# Patient Record
Sex: MALE | Race: WHITE
[De-identification: names, ages, dates, MRNs, and addresses within clinical notes are randomized per-mention and may not be internally consistent; named-entity substitution may affect disease eponyms.]

---
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
## 2019-11-05 NOTE — CT
EXAM: CTA of the chest and abdomen



HISTORY: Cardiac arrest



COMPARISON: None



TECHNIQUE: Multiple contiguous axial images were obtained a CTA of the chest and abdomen with contras
t per aortic dissection protocol. Sagittal and coronal 3-D MIP reformats were performed.



FINDINGS:

HEART: Normal in size without focal cardiac abnormality.

PULMONARY ARTERIES: Normal in caliber without filling defects to suggest pulmonary emboli.

MEDIASTINUM: No hilar or mediastinal lymphadenopathy. There is an endotracheal tube with its tip abov
e the javier.

LUNGS: Bilateral dependent atelectasis versus infiltrates.

PLEURAL SPACE: No pleural effusion or pneumothorax.



CHEST AND ABDOMINAL WALL SOFT TISSUES: A left subclavian central venous catheter seen with its tip in
 the superior vena cava.



LIVER: Unremarkable.

GALLBLADDER: Unremarkable.

KIDNEYS: 4 mm nonobstructing left renal calcification..

SPLEEN: Unremarkable.

PANCREAS: Unremarkable.



BOWEL: Unremarkable. An NG tube is seen in the stomach.

RETROPERITONEUM: No lymphadenopathy

BONES: Unremarkable



ASCENDING THORACIC AORTA:  Normal caliber without evidence of dissection or aneurysmal dilatation.

DESCENDING THORACIC AORTA:  Normal caliber without evidence of dissection or aneurysmal dilatation.

ABDOMINAL AORTA: Normal caliber without evidence of dissection or aneurysmal dilatation.

CELIAC TRUNK: Patent

SMA: Patent

BEN: Patent

RENAL ARTERIES: Bilateral single renal arteries without significant atherosclerotic disease



IMPRESSION:



1. No evidence of thoracic or abdominal aortic aneurysm or dissection

2. Nonobstructing left renal calcification



Reported By: Helio Stephens 

Electronically Signed:  11/5/2019 5:04 PM

## 2019-11-05 NOTE — CT
CT Brain WO Con:



11/5/2019 4:18 PM



CLINICAL HISTORY: History of cardiac arrest; history of fall and collapse at the seen; history of V. 
tach.



IMAGING TECHNIQUE: Multiple CT images were obtained of the brain without IV contrast.



COMPARISON: None.



FINDINGS:

Brain:  No acute infarct or hemorrhage is evident.  No midline shift.

Ventricles: Normal.  No hydrocephalus..

Skull: Intact..

Visualized Paranasal sinuses: There is mild mucosal thickening within the posterior left ethmoid air 
cells..

Mastoid air cells:Clear.

Extracranial soft tissues:Normal.



IMPRESSION:



No acute intracranial abnormality. 



Reported By: Dean Epstein 

Electronically Signed:  11/5/2019 4:57 PM

## 2019-11-05 NOTE — RAD
EXAM: 

CHEST ONE VIEW



HISTORY:

Cardiac arrest.



COMPARISON:

None



FINDINGS:

Endotracheal tube is noted in place with the tip of the endotracheal tube overlying the T1-2 level at
 the level of the thoracic inlet. Nasogastric tube is noted in place which courses into the upper

abdomen. The distal tip is not imaged. Left subclavian central venous catheter is noted in place with
 tip overlying the expected location of the SVC. There is accentuation of the cardiac silhouette

and bronchovascular markings due to a shallow depth of inspiration and the portable technique of the 
study. No consolidation or pleural fluid is seen. Pacing device overlies the right lower lateral

chest. The osseous structures are intact.



IMPRESSION:



1. Lines and tubes in place as described above.

2. Accentuation of the cardiac silhouette and bronchovascular markings due to shallow depth of inspir
ation and portable technique.



Reported By: Jose Cuadra 

Electronically Signed:  11/5/2019 4:32 PM

## 2019-11-05 NOTE — RAD
EXAM: Single view of the chest



HISTORY:   Post intubation



COMPARISON: 11/5/2019



FINDINGS: Single view of the chest shows an enlarged but stable cardiomediastinal silhouette. There i
s an NG tube with its tip in the upper esophagus. The endotracheal tube and central venous catheter

are unchanged in position.  There is no evidence of consolidation, mass, or pleural effusion. The bon
es are unremarkable.



IMPRESSION: Malpositioned NG tube.



Reported By: Helio Stephens 

Electronically Signed:  11/5/2019 6:04 PM

## 2019-11-06 NOTE — RAD
EXAM: Single view of the abdomen



HISTORY: OG tube placement



COMPARISON: None



FINDINGS: Single view of the abdomen shows a nonspecific, nonobstructive bowel gas pattern. An NG tub
e is seen in the stomach. There may be a calcification projecting over the left renal shadow.  The

bones are unremarkable.



IMPRESSION: 

1. OG tube located in the stomach

2. Left nephrolithiasis



Reported By: Helio Stephens 

Electronically Signed:  11/6/2019 7:18 PM
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
## 2019-11-06 NOTE — PDOC.HOSPP
- Subjective


Encounter Date: 11/06/19


Encounter Time: 08:38


Subjective: 





Mr. Abbasi was seen today in follow-up of out of hospital cardiac arrest. He is 

currently intubated. He is moving all extremities. He was reported to have 

squeezed the nurses hand on command.





- Objective


Vital Signs & Weight: 


 Vital Signs (12 hours)











  Pulse Resp BP


 


 11/06/19 08:15  78   104/75


 


 11/06/19 06:00   20 


 


 11/06/19 04:00   20 


 


 11/06/19 02:00   20 


 


 11/06/19 00:00   20 


 


 11/05/19 22:00   20 








 Weight











Weight                         239 lb 3.225 oz











 Most Recent Monitor Data











Heart Rate from ECG            84


 


NIBP                           94/64


 


NIBP BP-Mean                   78


 


Respiration from ECG           20


 


SpO2                           100














I&O: 


 











 11/05/19 11/06/19 11/07/19





 06:59 06:59 06:59


 


Intake Total  1999.0 38.3


 


Output Total  815 125


 


Balance  1184.0 -86.7











Result Diagrams: 


 11/06/19 04:20





 11/06/19 04:20


Additional Labs: 


 Accuchecks











  11/05/19





  16:20


 


POC Glucose  170 H














Hospitalist ROS





- Medication


Medications: 


Active Medications











Generic Name Dose Route Start Last Admin





  Trade Name Freq  PRN Reason Stop Dose Admin


 


Famotidine  20 mg  11/05/19 21:00  11/05/19 22:22





  Pepcid  SLOW IVP   20 mg





  Q12HR PURVI   Administration





     





     





     





     


 


Fentanyl Citrate 2,000 mcg/  100 mls @ 0 mls/hr  11/05/19 16:24  11/06/19 01:13





  Sodium Chloride  IV  12/05/19 16:24  100 mls





  INF PURVI   Administration





     





     





  Protocol   





  Per Protocol   


 


Amiodarone HCl 450 mg/  259 mls @ 0 mls/hr  11/05/19 22:15  11/05/19 22:25





Miscellaneous Medication 1  IVPB   259 mls





each/ Dextrose/Water  INF PURVI   Administration





     





     





  Protocol   





  As Directed   


 


Lorazepam  2 mg  11/05/19 16:45  11/05/19 21:24





  Ativan  SLOW IVP  12/05/19 16:45  2 mg





  Q1H PRN   Administration





  Breakthrough agitation   





     





     





     


 


Sodium Chloride  10 ml  11/05/19 21:00  11/05/19 22:22





  Flush - Normal Saline  IVF   10 ml





  Q12HR PURVI   Administration





     





     





     





     


 


Vecuronium Bromide  10 mg  11/06/19 02:00  11/06/19 05:23





  Norcuron  IV   10 mg





  Q30MIN PRN   Administration





  SHIVERING   





     





     





     














- Exam


Eye: PERRL


Heart: RRR, no murmur, no gallops, no rubs, normal peripheral pulses


Respiratory: CTAB (with some rhonchi bilaterally), no rales


Gastrointestinal: soft, non-distended, normal bowel sounds


Extremities: no cyanosis, no clubbing, no edema





Hosp A/P


(1) Cardiac arrest


Code(s): I46.9 - CARDIAC ARREST, CAUSE UNSPECIFIED   Status: Acute   





(2) Cardiomyopathy


Code(s): I42.9 - CARDIOMYOPATHY, UNSPECIFIED   Status: Acute   





(3) Acute respiratory failure with hypoxia


Code(s): J96.01 - ACUTE RESPIRATORY FAILURE WITH HYPOXIA   Status: Acute   





- Plan





* Patient is s/p out of hospital cardiac arrest. He had ROSC after 2 episodes 

of CPR. He has been found to have a Cardiomyopathy with an EF of 20-25%


* Discussed with Dr. Azul


* Will begin to re-warm him


* Plan is for cardiac cath today


* Ventilator management as per Pulmonology

## 2019-11-06 NOTE — RAD
XR Chest 1 View Portable



History: Ventilated patient



Comparison: Radiograph prior day



Findings: Endotracheal tube tip just below the level of the clavicles. No enteric tube is appreciated
. Central venous catheter tip mid SVC.



Small effusions. Scattered atelectasis. No pneumothorax.



Impression: Interval removal of the enteric tube otherwise similar exam.



Reported By: Pancho Glynn 

Electronically Signed:  11/6/2019 9:46 AM

## 2019-11-06 NOTE — CON
DATE OF CONSULTATION:  



HISTORY OF PRESENT ILLNESS:  Pramod Abbasi is a 39-year-old gentleman, who was

brought into the ER last night after he had apparently a cardiac arrest. 



He owns a construction company.  Apparently, he collapsed at work.  He was found to

be in VFib, ventricular tachycardia, pulseless electrical activity.  As per the EMS,

CPR was initiated.  Given 3 amps of epi, 300 mg amiodarone, intubated, LMA placed

and transferred to [facility] where seen by the ER physician and ongoing

additional prolonged CPR, shock off, he was revived after 20 minutes in the ER. 



These findings were discussed with the hospitalist on call last night.  He was

placed apparently on a hypothermia protocol as per my suggestion.  Unfortunately, he

was shivering throughout the night and received maximum fentanyl and propofol.  He

was given paralytics at 0200 hours in the morning.  Apparently, this morning became

more responsive, more appropriate.  His hypothermia protocol was discontinued. 



His fiancee is at the bedside who states that he had a physical a year ago.  He

takes no medication.  He has never been to the hospital. 



PAST MEDICAL HISTORY:  Apparently unremarkable.



PAST SURGICAL HISTORY:  None.



CHRONIC MEDICATIONS:  None.



ALLERGIES:  NONE.



SOCIAL HISTORY:  He owns a construction company.



PHYSICAL EXAMINATION:

VITAL SIGNS:  He is still on fentanyl and Diprivan.  His pulse is 90, blood pressure

90/80, respirations are 20. 

HEENT:  Pupils are equal. 

CHEST:  Decreased breath sounds.  No wheezing. 

CARDIAC:  Normal S1, S2.  No gallops. 

ABDOMEN:  Soft.  No masses.



LABORATORY DATA:  White count 8000, H and H 13 and 39, platelet count 167.  Lytes

are normal. 



CK-MB is abnormal.  Urine is normal.  He had benzos in his urine.  Chest x-ray was

normal on admission, slight cardiomegaly.  CT brain showed no acute abnormality.  CT

chest dissection protocol, no aneurysm or dissection was seen.  There is a

nonobstructive left renal calculi. 



IMPRESSION:  Status post cardiopulmonary arrest, ejection fraction 20%.



PLAN:  Await input from Cardiology.  Continue vent support.  Discontinue paralytics.

 Start nutrition 24-48 hours. 



PT subsequently. 



45 minutes of critical time.







Job ID:  [id number]

## 2019-11-07 NOTE — PDOC.CPN
- Subjective


Date: 11/07/19


Time: 08:53


Interval history: 





The pt seen and examined.  No overnight events.  He is on Vent with sedation.  

He opened his eyes with verbal stimulation.  Per family, he respond well 

yesterday. 





- Objective


Allergies/Adverse Reactions: 


 Allergies











Allergy/AdvReac Type Severity Reaction Status Date / Time


 


No Allergy Information Allergy   Verified 11/06/19 07:55





Available     











Visit Medications: 


 Current Medications





Acetaminophen (Tylenol Elixir)  650 mg PO Q4H PRN


   PRN Reason: Headache/Fever/Mild Pain (1-3)


   Last Admin: 11/07/19 07:42 Dose:  650 mg


Acetaminophen/Codeine Phosphate (Tylenol #3)  1 tab PO Q4H PRN


   PRN Reason: Mild Pain (1-3)


Acetaminophen/Codeine Phosphate (Tylenol #3)  2 tab PO Q4H PRN


   PRN Reason: Moderate Pain (4-6)


Enoxaparin Sodium (Lovenox)  60 mg SC DAILY [facility]


Famotidine (Pepcid)  20 mg SLOW IVP Q12HR [facility]


   Last Admin: 11/06/19 20:46 Dose:  20 mg


Fentanyl Citrate 2,000 mcg/ (Sodium Chloride)  100 mls @ 0 mls/hr IV INF [facility]; 

Protocol


   Stop: 12/05/19 16:24


   Last Admin: 11/06/19 17:56 Dose:  100 mls


Levetiracetam 500 mg/ Device  100 mls @ 200 mls/hr IVPB BID [facility]


   Last Admin: 11/06/19 20:46 Dose:  100 mls


Amiodarone HCl 450 mg/Miscellaneous Medication 1 each/ Dextrose/Water  259 mls 

@ 0 mls/hr IVPB INF [facility]; Protocol


   Last Admin: 11/07/19 04:02 Dose:  259 mls


Norepinephrine Bitartrate (Levophed)  250 mls @ 0 mls/hr IVPB INF [facility]; Protocol


Sodium Chloride (Normal Saline 0.9%)  200 mls @ 0 mls/hr IV ONE PRN


   PRN Reason: SBP < 90


   Stop: 11/07/19 17:58


Ceftriaxone Sodium 1 gm/ (Sodium Chloride)  100 mls @ 200 mls/hr IVPB Q24HR [facility]


Lorazepam (Ativan)  2 mg SLOW IVP Q1H PRN


   PRN Reason: Breakthrough agitation


   Stop: 12/05/19 16:45


   Last Admin: 11/05/19 21:24 Dose:  2 mg


Miscellaneous Information (Communication Order-Pharmacy)  1 each FS 

.COMMUNICATION [facility]


Miscellaneous Medication (Dc Sedation Protocol)  1 each FS ONE ONE


   Stop: 11/07/19 08:41


Morphine Sulfate (Morphine)  2 mg SLOW IVP Q1H PRN


   PRN Reason: BREAKTHROUGH PAIN/Agitation


   Stop: 12/05/19 16:45


Nitroglycerin (Nitrostat)  0.4 mg SL Q5MIN PRN


   PRN Reason: Chest Pain


Discontinue Previous Narcotic Pain Medications And Benzodiazepines  1 each FS 

.ONE [facility]


   Stop: 12/05/19 16:45


Propofol (Diprivan)  1,000 mg IV INF PRN; Protocol


   PRN Reason: TO ACHIEVE GOAL RASS


   Stop: 12/05/19 16:45


   Last Admin: 11/07/19 05:33 Dose:  1,000 mg


Propofol (Diprivan Bolus)  20 mg IV Q5MIN PRN


   PRN Reason: BREAKTHROUGH AGITATION


   Stop: 12/05/19 16:45


Sodium Chloride (Flush - Normal Saline)  10 ml IVF Q12HR [facility]


   Last Admin: 11/06/19 20:47 Dose:  10 ml


Sodium Chloride (Flush - Normal Saline)  10 ml IVF PRN PRN


   PRN Reason: Saline Flush








Vital Signs & Weight: 


 Vital Signs











  Pulse Resp


 


 11/07/19 08:00   22 H


 


 11/07/19 07:30  91 


 


 11/07/19 06:00   20


 


 11/07/19 04:00   20


 


 11/07/19 02:26  81 


 


 11/07/19 02:00   20


 


 11/07/19 00:00   20


 


 11/06/19 22:11  79 


 


 11/06/19 22:00   20








 











Admit Weight                   239 lb 3.225 oz


 


Weight                         239 lb 3.225 oz

















- Physical Exam


Cardiac: regular rate and rhythm, S1/S2


Lungs: decreased breath sounds


Skin: clear





- Labs


Result Diagrams: 


 11/07/19 12:57





 11/07/19 12:57


 Troponin/CKMB











CK-MB (CK-2)  21.4 ng/mL (0-6.6)  H*  11/06/19  17:00    


 


Troponin I  0.189 ng/mL (< 0.028)  H  11/06/19  17:00    














- Telemetry


Sinus rhythms and dysrhythmias: sinus rhythm





- Assessment/Plan


Assessment/Plan: 





1. s/p Cardiac arrest - On Amiodarone drip


2. Familial CMY - s/p LHC on 1/06/2019 wtih normal coronary arteries.


3. Acute on Chronic systolic HF


MAR reviewed





* Echo on 11/06/2019 with EF 20-25%, global hypokinesis, mild MR and TR





Pt. seen and eval. by me. He is extubated. RRR. Neurologically seems to be 

recovering. No arrhythmias. Tender chest wall s/p CPR. Movement of the ant. 

chest ribs with palpation. Likely fractures s/p CPR. Plan for AICD tomorrow.


Start Coreg and lisinopril when stable or start cozaar with the plan to switch 

to Entresto or possibly just start Entresto.

## 2019-11-07 NOTE — PDOC.HOSPP
- Subjective


Encounter Date: 11/07/19


Encounter Time: 09:18


Subjective: 





Mr. Boyd was seen today in follow-up of cardiomyopathy with sudden death. He 

is now extubated. He is awake and alert, but confused about what has happened 

over the past 2 days. He notes soreness in his chest.





- Objective


Vital Signs & Weight: 


 Vital Signs (12 hours)











  Pulse Resp


 


 11/07/19 08:00   22 H


 


 11/07/19 07:30  91 


 


 11/07/19 06:00   20


 


 11/07/19 04:00   20


 


 11/07/19 02:26  81 


 


 11/07/19 02:00   20


 


 11/07/19 00:00   20


 


 11/06/19 22:11  79 


 


 11/06/19 22:00   20








 Weight











Admit Weight                   239 lb 3.225 oz


 


Weight                         239 lb 3.225 oz











 Most Recent Monitor Data











Heart Rate from ECG            92


 


NIBP                           105/65


 


NIBP BP-Mean                   77


 


Respiration from ECG           19


 


SpO2                           97














I&O: 


 











 11/06/19 11/07/19 11/08/19





 06:59 06:59 06:59


 


Intake Total 1999.0 1508.1 


 


Output Total 815 947 5


 


Balance 1184.0 561.1 -5











Result Diagrams: 


 11/07/19 05:30





 11/07/19 05:30





Hospitalist ROS





- Medication


Medications: 


Active Medications











Generic Name Dose Route Start Last Admin





  Trade Name Freq  PRN Reason Stop Dose Admin


 


Acetaminophen  650 mg  11/07/19 07:22  11/07/19 07:42





  Tylenol Elixir  PO   650 mg





  Q4H PRN   Administration





  Headache/Fever/Mild Pain (1-3)   





     





     





     


 


Famotidine  20 mg  11/05/19 21:00  11/06/19 20:46





  Pepcid  SLOW IVP   20 mg





  Q12HR [facility]   Administration





     





     





     





     


 


Levetiracetam 500 mg/ Device  100 mls @ 200 mls/hr  11/06/19 09:00  11/06/19 20:

46





  IVPB   100 mls





  BID [facility]   Administration





     





     





     





     


 


Amiodarone HCl 450 mg/  259 mls @ 0 mls/hr  11/05/19 22:15  11/07/19 04:02





Miscellaneous Medication 1  IVPB   259 mls





each/ Dextrose/Water  INF [facility]   Administration





     





     





  Protocol   





  As Directed   


 


Sodium Chloride  10 ml  11/05/19 21:00  11/06/19 20:47





  Flush - Normal Saline  IVF   10 ml





  Q12HR [facility]   Administration





     





     





     





     














- Exam


Eye: PERRL


Heart: RRR, no murmur, normal peripheral pulses


Heart - other findings: + S3


Respiratory: CTAB (except occasional rhonchi)


Gastrointestinal: soft, non-tender, non-distended, normal bowel sounds, no 

palpable masses, no hepatomegaly


Extremities: no cyanosis, no clubbing, no edema


Psychiatric: normal affect, A&O x 3





Hosp A/P


(1) Cardiac arrest


Code(s): I46.9 - CARDIAC ARREST, CAUSE UNSPECIFIED   Status: Acute   





(2) Cardiomyopathy


Code(s): I42.9 - CARDIOMYOPATHY, UNSPECIFIED   Status: Acute   





(3) Acute respiratory failure with hypoxia


Code(s): J96.01 - ACUTE RESPIRATORY FAILURE WITH HYPOXIA   Status: Acute   





- Plan





* Cardiomyopathy with sudden death-Discussed with Dr. Azul. He has normal 

coronary arteries.


* He has been evaluated by EP, and the plan is the placement of a defibrillator


* Respiratory failure- improved- he has been extubated. He will need pain 

control, and aggressive pulmonary toilet

## 2019-11-07 NOTE — PDOC.CPN
- Subjective


Date: 11/07/19


Time: 08:00


Interval history: 





EP PROGRESS NOTE: 11/7/19





Follow up after VF arrest. Patient just extubated and is somewhat disoriented 

from his recent sedation. 





- Review of Systems


ROS unobtainable: due to mental status





- Objective


Allergies/Adverse Reactions: 


 Allergies











Allergy/AdvReac Type Severity Reaction Status Date / Time


 


No Allergy Information Allergy   Verified 11/06/19 07:55





Available     











Visit Medications: 


 Current Medications





Acetaminophen (Tylenol Elixir)  650 mg PO Q4H PRN


   PRN Reason: Headache/Fever/Mild Pain (1-3)


   Last Admin: 11/07/19 07:42 Dose:  650 mg


Acetaminophen/Codeine Phosphate (Tylenol #3)  1 tab PO Q4H PRN


   PRN Reason: Mild Pain (1-3)


Acetaminophen/Codeine Phosphate (Tylenol #3)  2 tab PO Q4H PRN


   PRN Reason: Moderate Pain (4-6)


   Last Admin: 11/07/19 15:09 Dose:  2 tab


Amiodarone HCl (Cordarone)  200 mg PO TID [facility]


   Last Admin: 11/07/19 15:09 Dose:  200 mg


Carvedilol (Coreg)  3.125 mg PO BID-[facility]


Enoxaparin Sodium (Lovenox)  60 mg SC DAILY [facility]


   Last Admin: 11/07/19 10:38 Dose:  60 mg


Famotidine (Pepcid)  20 mg SLOW IVP Q12HR [facility]


   Last Admin: 11/07/19 10:38 Dose:  20 mg


Levetiracetam 500 mg/ Device  100 mls @ 200 mls/hr IVPB BID [facility]


   Last Admin: 11/07/19 11:17 Dose:  100 mls


Norepinephrine Bitartrate (Levophed)  250 mls @ 0 mls/hr IVPB INF [facility]; Protocol


Sodium Chloride (Normal Saline 0.9%)  200 mls @ 0 mls/hr IV ONE PRN


   PRN Reason: SBP < 90


   Stop: 11/07/19 17:58


Ceftriaxone Sodium 1 gm/ (Sodium Chloride)  100 mls @ 200 mls/hr IVPB Q24HR [facility]


   Last Admin: 11/07/19 11:32 Dose:  100 mls


Ipratropium Bromide (Atrovent)  2.5 ml NEB [id number] PRN


   PRN Reason: SOB &/or Wheezing


Miscellaneous Information (Communication Order-Pharmacy)  1 each FS 

.COMMUNICATION [facility]


Nitroglycerin (Nitrostat)  0.4 mg SL Q5MIN PRN


   PRN Reason: Chest Pain


Sodium Chloride (Flush - Normal Saline)  10 ml IVF Q12HR [facility]


   Last Admin: 11/07/19 10:38 Dose:  10 ml


Sodium Chloride (Flush - Normal Saline)  10 ml IVF PRN PRN


   PRN Reason: Saline Flush








Vital Signs & Weight: 


 Vital Signs











  Temp Pulse Resp


 


 11/07/19 12:00  99 F  


 


 11/07/19 08:00  101.5 F H   22 H


 


 11/07/19 07:30   91 


 


 11/07/19 06:00    20


 


 11/07/19 04:00    20








 











Admit Weight                   239 lb 3.225 oz


 


Weight                         239 lb 3.225 oz

















- Physical Exam


General: appears well, no apparent distress


HEENT: mucus membranes moist, normocephaly


Neck: supple neck, midline trachea, no JVD/HJR, no masses, no bruit, no 

lymphadenopathy, no thromegaly


Cardiac: regular rate and rhythm, no murmur, regular rate, regular rhythm


Lungs: clear to auscultation, normal breath sounds, normal exam, no wheeze, 

rales, rhonchi


Neuro: cranial nerve 2-12 intact, grossly intact, coordination normal, no 

lateralizing findings


Abdomen: unremarkable, active bowel sounds, soft


Extremities: no cyanosis, no clubbing, no edema





- Labs


Result Diagrams: 


 11/07/19 12:57





 11/07/19 12:57


 Troponin/CKMB











CK-MB (CK-2)  21.4 ng/mL (0-6.6)  H*  11/06/19  17:00    


 


Troponin I  0.189 ng/mL (< 0.028)  H  11/06/19  17:00    














- Telemetry


Sinus rhythms and dysrhythmias: sinus rhythm





- Assessment/Plan


Assessment/Plan: 





1. Ventricular fibrillation arrest with successful resuscitation and 

defibrillation.


2. Newly found cardiomyopathy, likely nonischemic with LVEF of 20% to 25%.








Continue amiodarone. Will transition to PO taper possibly tomorrow if taking 

eating/drinking. Clearly meets criteria for ICD implant for secondary 

prevention of sudden cardiac death with cardiomyopathy.   Discussed R/B/A with 

family and patient who wish to proceed. Plan for implant tomorrow. Plan for 

single chamber ICD unless new arrhythmia issues are seen before implant.
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
## 2019-11-07 NOTE — RAD
EXAM: 

CHEST ONE VIEW



HISTORY:

On ventilator. Follow-up evaluation.



COMPARISON:

11/6/2019.



FINDINGS:

Endotracheal tube remains in place with tip overlying the region of the T2 vertebral body. Nasogastri
c tube has been placed in the interim which courses into the upper abdomen. The tip is not imaged.

Cardiac monitor leads again overlie the chest. Left subclavian central venous catheter is stable in p
osition. This examination is obtained in a shallow depth of inspiration which accentuates the

cardiac silhouette and bronchovascular markings. Subsegmental atelectasis at the right lung base is a
gain seen. There is increased density at the left lung base which may be related to atelectasis,

left pleural effusion or possibly superimposed infiltrate/pneumonia. There has been no significant in
terval change from prior study.



IMPRESSION:

Interval placement of a nasogastric tube, but the chest is otherwise stable.



Reported By: Jose Cuadra 

Electronically Signed:  11/7/2019 8:05 AM

## 2019-11-07 NOTE — PRG
DATE OF SERVICE:  11/07/2019



SUBJECTIVE:  This morning, he is awake, and responsive.  X-ray still shows

cardiomegaly. 



OBJECTIVE:  VITAL SIGNS:  Pulse 90, respiratory rate 22, blood pressure __________. 

CHEST:  Decreased breath sounds.  No wheezing.  Bilateral crackles and rhonchi

anteriorly. 

CARDIAC:  Normal S1 and S2.  No gallops. 

ABDOMEN:  No masses.



LABORATORY DATA:  Lytes are normal.  Troponin is elevated.  White count 9000, H and

H are 11 and 36.  X-ray shows cardiomegaly, bibasilar atelectatic changes. 



IMPRESSION:  

1. Respiratory failure.

2. Cardiomyopathy.

3. Status post ventricular tachycardia.



PLAN:  He went to the cardiac cath last night.  Apparently, cords were normal. 



Wean and extubate.  Empiric antibiotics.  Supportive care. 



One-half hour of critical time.







Job ID:  [id number]

## 2019-11-08 NOTE — PRG
DATE OF SERVICE:  11/08/2019



SUBJECTIVE:  Pramod Abbasi, this morning, is awake, alert, responsive.  He is no

longer confused. 



OBJECTIVE:  VITAL SIGNS:  Pulse 84, blood pressure 127/80, saturations 96%, and

respirations 25. 

CHEST:  Decreased breath sounds.  No wheezing. 

CARDIAC:  Normal S1 and S2.  No gallops. 

ABDOMEN:  No masses.



LABORATORY DATA:  Lytes are normal.  White count is normal.



IMAGING DATA:  X-ray shows cardiomegaly.



IMPRESSION:  Status post ventricular tachycardia, ventricular fibrillation,

cardiopulmonary arrest, prolonged CPR, cardiomyopathy, respiratory failure. 



PLAN:  Continue supportive care.  He is scheduled to have an AICD placed in today. 



We will follow.  Switch over to oral antibiotics tomorrow.







Job ID:  [id number]

## 2019-11-08 NOTE — PDOC.HOSPP
- Subjective


Encounter Date: 11/08/19


Encounter Time: 10:05


Subjective: 





Mr. Abbasi was seen today in follow-up of non-ischemic cardiomyopathy, and post 

cardiac arrest due to sudden death. He does not have any complaints. He 

continues to have some amnesia of the previous few days. The soreness in his 

chest has improved.





- Objective


Vital Signs & Weight: 


 Vital Signs (12 hours)











  Temp Pulse Ox


 


 11/08/19 04:00  98.7 F 


 


 11/08/19 00:15   96


 


 11/08/19 00:00  98.3 F 








 Weight











Admit Weight                   239 lb 3.225 oz


 


Weight                         246 lb 4.101 oz











 Most Recent Monitor Data











Heart Rate from ECG            82


 


NIBP                           123/79


 


NIBP BP-Mean                   84


 


Respiration from ECG           23


 


SpO2                           97














I&O: 


 











 11/07/19 11/08/19 11/09/19





 06:59 06:59 06:59


 


Intake Total 1508.1 2143 


 


Output Total 947 1965 


 


Balance 561.1 178 











Result Diagrams: 


 11/08/19 03:15





 11/08/19 03:15





Hospitalist ROS





- Medication


Medications: 


Active Medications











Generic Name Dose Route Start Last Admin





  Trade Name Freq  PRN Reason Stop Dose Admin


 


Acetaminophen  650 mg  11/07/19 07:22  11/07/19 07:42





  Tylenol Elixir  PO   650 mg





  Q4H PRN   Administration





  Headache/Fever/Mild Pain (1-3)   





     





     





     


 


Acetaminophen/Codeine Phosphate  2 tab  11/06/19 17:57  11/07/19 15:09





  Tylenol #3  PO   2 tab





  Q4H PRN   Administration





  Moderate Pain (4-6)   





     





     





     


 


Hydrocodone Bitart/Acetaminophen  1 tab  11/07/19 18:25  11/08/19 06:36





  [place] 5/325  PO   1 tab





  Q4H PRN   Administration





  Severe Pain (7-10)   





     





     





     


 


Amiodarone HCl  200 mg  11/07/19 15:00  11/08/19 08:46





  Cordarone  PO   200 mg





  TID [facility]   Administration





     





     





     





     


 


Carvedilol  3.125 mg  11/07/19 17:00  11/08/19 08:46





  Coreg  PO   3.125 mg





  BID-WM [facility]   Administration





     





     





     





     


 


Enoxaparin Sodium  60 mg  11/07/19 09:00  11/08/19 07:31





  Lovenox  SC   Not Given





  DAILY [facility]   





     





     





     





     


 


Famotidine  20 mg  11/05/19 21:00  11/08/19 08:46





  Pepcid  SLOW IVP   20 mg





  Q12HR [facility]   Administration





     





     





     





     


 


Ceftriaxone Sodium 1 gm/  100 mls @ 200 mls/hr  11/07/19 09:00  11/08/19 08:45





  Sodium Chloride  IVPB   100 mls





  Q24HR [facility]   Administration





     





     





     





     


 


Sodium Chloride  10 ml  11/05/19 21:00  11/08/19 09:07





  Flush - Normal Saline  IVF   10 ml





  Q12HR [facility]   Administration





     





     





     





     


 


Tramadol HCl  50 mg  11/07/19 15:54  11/08/19 04:11





  Ultram  PO   50 mg





  Q6H PRN   Administration





  Breakthrough Pain   





     





     





     














- Exam


Eye: PERRL


Heart: RRR, no murmur, no gallops, no rubs, normal peripheral pulses


Respiratory: CTAB, no wheezes, no rales, no ronchi, normal chest expansion


Gastrointestinal: soft, non-tender, non-distended, normal bowel sounds, no 

palpable masses


Extremities: no cyanosis, no clubbing, no edema





Hosp A/P


(1) Cardiac arrest


Code(s): I46.9 - CARDIAC ARREST, CAUSE UNSPECIFIED   Status: Acute   





(2) Cardiomyopathy


Code(s): I42.9 - CARDIOMYOPATHY, UNSPECIFIED   Status: Acute   





(3) Acute respiratory failure with hypoxia


Code(s): J96.01 - ACUTE RESPIRATORY FAILURE WITH HYPOXIA   Status: Acute   





- Plan





*  Non-Ischemic Cardiomyopathy with sudden death-plan is for single chamber 

defibrillator today


* Respiratory failure- improved- He will need pain control, and aggressive 

pulmonary toilet

## 2019-11-08 NOTE — PDOC.CPN
- Subjective


Date: 11/08/19


Time: 11:00





- Review of Systems


Respiratory: denies: shortness of breath


Cardiovascular: reports: chest pain, palpitation (tender chest wall after CPR.)

.  denies: edema


Gastrointestinal: denies: nausea, vomiting


Musculoskeletal: reports: pain.  denies: stiffness, swelling


Neurological: denies: numbness, seizure





- Objective


Allergies/Adverse Reactions: 


 Allergies











Allergy/AdvReac Type Severity Reaction Status Date / Time


 


No Known Drug Allergies Allergy   Verified 11/08/19 13:44











Visit Medications: 


 Current Medications





Acetaminophen (Tylenol Elixir)  650 mg PO Q4H PRN


   PRN Reason: Headache/Fever/Mild Pain (1-3)


   Last Admin: 11/07/19 07:42 Dose:  650 mg


Acetaminophen/Codeine Phosphate (Tylenol #3)  1 tab PO Q4H PRN


   PRN Reason: Mild Pain (1-3)


Acetaminophen/Codeine Phosphate (Tylenol #3)  2 tab PO Q4H PRN


   PRN Reason: Moderate Pain (4-6)


   Last Admin: 11/08/19 12:46 Dose:  2 tab


Hydrocodone Bitart/Acetaminophen (Norco 5/325)  1 tab PO Q4H PRN


   PRN Reason: Severe Pain (7-10)


   Last Admin: 11/08/19 06:36 Dose:  1 tab


Amiodarone HCl (Cordarone)  200 mg PO TID [facility]


   Last Admin: 11/08/19 14:58 Dose:  200 mg


Carvedilol (Coreg)  3.125 mg PO BID-[facility]


   Last Admin: 11/08/19 08:46 Dose:  3.125 mg


Enoxaparin Sodium (Lovenox)  60 mg SC DAILY [facility]


   Last Admin: 11/08/19 07:31 Dose:  Not Given


Famotidine (Pepcid)  20 mg SLOW IVP Q12HR [facility]


   Last Admin: 11/08/19 08:46 Dose:  20 mg


Norepinephrine Bitartrate (Levophed)  250 mls @ 0 mls/hr IVPB INF [facility]; Protocol


Ceftriaxone Sodium 1 gm/ (Sodium Chloride)  100 mls @ 200 mls/hr IVPB Q24HR [facility]


   Last Admin: 11/08/19 08:45 Dose:  100 mls


Ipratropium Bromide (Atrovent)  2.5 ml NEB [id number] PRN


   PRN Reason: SOB &/or Wheezing


   Last Admin: 11/08/19 13:30 Dose:  2.5 ml


Miscellaneous Information (Communication Order-Pharmacy)  1 each FS 

.COMMUNICATION [facility]


Nitroglycerin (Nitrostat)  0.4 mg SL Q5MIN PRN


   PRN Reason: Chest Pain


Sodium Chloride (Flush - Normal Saline)  10 ml IVF Q12HR [facility]


   Last Admin: 11/08/19 09:07 Dose:  10 ml


Sodium Chloride (Flush - Normal Saline)  10 ml IVF PRN PRN


   PRN Reason: Saline Flush


Tramadol HCl (Ultram)  50 mg PO Q6H PRN


   PRN Reason: Breakthrough Pain


   Last Admin: 11/08/19 11:10 Dose:  50 mg








Vital Signs & Weight: 


 Vital Signs











  Temp Pulse Pulse Pulse Resp BP BP


 


 11/08/19 13:30   88    20  


 


 11/08/19 12:00  98.8 F      


 


 11/08/19 09:40    79  100   125/92 H  132/91 H


 


 11/08/19 08:00       


 


 11/08/19 07:00  98.9 F      














  Pulse Ox


 


 11/08/19 13:30  94 L


 


 11/08/19 12:00 


 


 11/08/19 09:40 


 


 11/08/19 08:00  93 L


 


 11/08/19 07:00 








 











Admit Weight                   239 lb 3.225 oz


 


Weight                         246 lb 4.101 oz

















- Quality Measures


CV meds: Beta Blocker: Yes, ACE/ARB: No (will start after BP stable.), Statin: 

No, ASA: Yes





- Physical Exam


HEENT: normocephaly


Neck: supple neck, no JVD/HJR, no lymphadenopathy


Cardiac: regular rate and rhythm, no murmur


Lungs: clear to auscultation, no wheezes, no rhonchi


Neuro: grossly intact, other (still some memory issues and mild confusion.)


Abdomen: unremarkable


Extremities: no cyanosis, no clubbing, no edema


Musculoskeletal: normal range of motion (pain in chest to palpation.)





- Labs


Result Diagrams: 


 11/08/19 03:15





 11/08/19 03:15


 Troponin/CKMB











CK-MB (CK-2)  21.4 ng/mL (0-6.6)  H*  11/06/19  17:00    


 


Troponin I  0.189 ng/mL (< 0.028)  H  11/06/19  17:00    














- Telemetry


Supraventricular conduction: other (short run 5-6 beats of NSVTACH this AM 

while he started to walk in CCU.)





- Assessment/Plan


Assessment/Plan: 








1. s/p Cardiac arrest - On Amiodarone , po. %-^ beat run of NSVTACH this AM. 

For AICD today.


2. Familial CMY - s/p LHC on 1/06/2019 wtih normal coronary arteries. Genetic 

testing of the father Variant in LMNA ([id number]). this has been identified as 

being associated with cardiomyopathy. I suspect he may have the same genetic 

variant.


3. Acute on Chronic systolic HF. Continue Coreg, increase as tolerated. Add 

cozaar or Entresto.


MAR reviewed





* Echo on 11/06/2019 with EF 20-25%, global hypokinesis, mild MR and TR

## 2019-11-08 NOTE — RAD
PORTABLE CHEST:

 

HISTORY: 

Respiratory distress.

 

COMPARISON: 

Prior day's exam.

 

FINDINGS: 

NG tube has been removed.  The left subclavian line is unchanged in position.  Heart size is enlarged
 with mild vascular engorgement.

 

IMPRESSION: 

Interval removal of endotracheal and NG Tubes; otherwise, stable exam.

 

POS: OFF

## 2019-11-09 NOTE — PDOC.HOSPP
- Subjective


Encounter Date: 11/09/19


Encounter Time: 11:00


Subjective: 





Queta is s/p cardiac arrest. States he had some warning symptoms on Monday 

where his face turned white and sweaty, but EMS told him EKG was fine. [place] 

[place] he had cardiac arrest.  He was found to be in Vtach, s/p AICD 11/8. 





 Patient states he is not starting to remember things for the first time.   

Patient is doing well, feels great. He ambulated around the hallway without 

problem.  He reports some rib pain when he coughs, he believes this is from 

CPR. He is not bringing up phlegm. Denies shortness of breath. 





Patient wants to be discharged and go home. However oxygen saturation on finger 

on room air was 87%. 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 








Per wife she feels patient is having some short term memory  loss and is asking 

same questions repeatedly but not as bad as yesterday. 








- Objective


Vital Signs & Weight: 


 Vital Signs (12 hours)











  Temp Pulse Pulse BP BP Pulse Ox Pulse Ox


 


 11/09/19 09:06   91  86  151/98 H  119/85   94 L


 


 11/09/19 07:22       96 


 


 11/09/19 07:00  98.2 F      


 


 11/09/19 04:00  98.6 F      


 


 11/09/19 00:00  98.3 F      














  Pulse Ox


 


 11/09/19 09:06  96


 


 11/09/19 07:22 


 


 11/09/19 07:00 


 


 11/09/19 04:00 


 


 11/09/19 00:00 








 Weight











Admit Weight                   239 lb 3.225 oz


 


Weight                         246 lb 4.101 oz











 Most Recent Monitor Data











Heart Rate from ECG            84


 


NIBP                           137/97


 


NIBP BP-Mean                   108


 


Respiration from ECG           18


 


SpO2                           91














I&O: 


 











 11/08/19 11/09/19 11/10/19





 06:59 06:59 06:59


 


Intake Total 2143 1142 360


 


Output Total 1965 1400 580


 


Balance 178 -258 -220











Result Diagrams: 


 11/09/19 05:10





 11/09/19 05:10





Hospitalist ROS





- Review of Systems


Constitutional: denies: fever, chills





- Medication


Medications: 


Active Medications











Generic Name Dose Route Start Last Admin





  Trade Name Freq  PRN Reason Stop Dose Admin


 


Acetaminophen  650 mg  11/07/19 07:22  11/07/19 07:42





  Tylenol Elixir  PO   650 mg





  Q4H PRN   Administration





  Headache/Fever/Mild Pain (1-3)   





     





     





     


 


Acetaminophen/Codeine Phosphate  2 tab  11/06/19 17:57  11/08/19 12:46





  Tylenol #3  PO   2 tab





  Q4H PRN   Administration





  Moderate Pain (4-6)   





     





     





     


 


Amiodarone HCl  200 mg  11/07/19 15:00  11/09/19 08:56





  Cordarone  PO   200 mg





  TID [facility]   Administration





     





     





     





     


 


Carvedilol  3.125 mg  11/07/19 17:00  11/09/19 08:56





  Coreg  PO   3.125 mg





  BID-WM [facility]   Administration





     





     





     





     


 


Enoxaparin Sodium  60 mg  11/07/19 09:00  11/09/19 08:56





  Lovenox  SC   60 mg





  DAILY [facility]   Administration





     





     





     





     


 


Ibuprofen  400 mg  11/08/19 19:48  11/08/19 20:12





  Motrin  PO   400 mg





  Q4H PRN   Administration





  Fever/Mild Pain   





     





     





     


 


Ipratropium Bromide  2.5 ml  11/07/19 09:28  11/08/19 13:30





  Atrovent  NEB   2.5 ml





  [id number] PRN   Administration





  SOB &/or Wheezing   





     





     





     


 


Sacubitril/Valsartan  1 tab  11/08/19 21:00  11/09/19 09:55





  Entresto 24 Mg-26 Mg Tablet  PO   1 tab





  BID [facility]   Administration





     





     





     





     


 


Sodium Chloride  10 ml  11/05/19 21:00  11/09/19 08:57





  Flush - Normal Saline  IVF   10 ml





  Q12HR [facility]   Administration





     





     





     





     


 


Tramadol HCl  50 mg  11/07/19 15:54  11/09/19 04:42





  Ultram  PO   50 mg





  Q6H PRN   Administration





  Breakthrough Pain   





     





     





     














- Exam


General Appearance: NAD, awake alert


Eye: PERRL, anicteric sclera


ENT: normocephalic atraumatic, no oropharyngeal lesions


Neck: supple, symmetric, no thyromegaly


Heart: RRR, no murmur, no gallops, no rubs


Respiratory: CTAB, no wheezes, no rales, no ronchi


Gastrointestinal: soft, non-tender, non-distended


Extremities: no cyanosis, no clubbing, no edema


Skin: normal turgor, no lesions, no rashes


Skin - other findings: Pacemaker in place on left side of chest, no signs of 

infection 


Neurological: cranial nerve grossly intact, normal sensation to touch, no focal 

deficits, no new deficit





Hosp A/P





- Plan


ECHO: mild MR, mild TR, global hypokinesis with EF 20%


Cardiac cath : normal 





This is 39 year old male who presented with out of hospital cardiac arrest, s/p 

AICD placement 11/8








Acute hypoxic respiratory failure s/p cardiac arrest s/p AICD placement


- ECHO showed EF 20%, cardiac cath was normal. Last troponin 0.189, patient 

denies chest pain


- continue amiodarone, coreg, entresto 


- on 2L oxygen saturating 95%, room air 87%. Will give one dose of 40 mg IV 

lasix per cardiology. Chest X ray today normal 


- resume diet 


- transfer to floor with telemetry


- s/p 3 days IV ceftriaxone, discontinue since no evidence of pneumonia 








Anemia


- Hb 11.8, stable, continue to monitor








Short term memory impairment


- likely ICU delirium


- check B12 level











Code status: full code

## 2019-11-09 NOTE — PRG
DATE OF SERVICE:  2019



SERVICE:  Pulmonary medicine.



INTERVAL HISTORY:  The patient is doing outstanding from respiratory 
standpoint.  He

still requiring a little bit of oxygen.  Denies any chest discomfort, nausea, or

vomiting.  He has been up walking the hallways without difficulty.  He got a

pacemaker placed and this was without incident.  Otherwise, he had an uneventful

evening. 



PHYSICAL EXAMINATION:

VITAL SIGNS:  Afebrile, pulse 86, blood pressure 135/93, respirations 22, 
saturation

96%, currently on 2 L nasal cannula. 

GENERAL:  The patient is awake and alert, in no apparent distress. 

LUNGS:  Decent air entry.  Dependent crackles are present.  There is decreased 
air

entry at the bases with dullness to percussion. 

HEART:  Normal rate.  Regular. 

ABDOMEN:  Soft, nontender, and nondistended.  Bowel sounds are positive. 

MUSCULOSKELETAL:  No cyanosis or clubbing.  There is no pitting in the bilateral

lower extremities. 

NEUROLOGIC:  Grossly nonfocal.



LABORATORY DATA:  WBC 7.3, hemoglobin 11.8 and stable, platelets 158,000.  Basic

metabolic profile is completely unremarkable except for potassium of 3.8.

Urinalysis is negative.  Urine drug screen is positive for cocaine.  Blood 
cultures

x4 are unremarkable. 



IMAGIN. Chest x-ray demonstrates enlarged cardiac silhouette.  Pulmonary vascular

congestion is evident.  I do not see any obvious large pleural effusion, though

there may be some pleural parenchymal density on the left.  Echocardiogram

demonstrates a 20% to 25% ejection fraction with global hypokinesis. 



ASSESSMENT:  

1. Acute hypoxic respiratory failure, improving.

2. Acute systolic heart failure.

3. Ventricular fibrillation arrest, status post pacemaker placement.



DISCUSSION AND PLAN:  We will give the patient a dose of Lasix and potassium.  
He

can be transitioned to the floor.  We will wean oxygen through time as 
tolerated.

Pulmonary/Critical Care will continue to follow along for the time being. 







Job ID:  [id number]



[facility]

## 2019-11-09 NOTE — RAD
EXAM:

Portable chest



PROVIDED CLINICAL HISTORY:

Respiratory insufficiency



COMPARISON:

None



FINDINGS:

Interval level of left sided central line. Interval placement of left subclavian cardiac pacing devic
e. Additional significant Interval change with respect to the prior examination is not apparent.



IMPRESSION:

As above.



Reported By: Derrick Parker 

Electronically Signed:  11/9/2019 7:31 AM

## 2019-11-09 NOTE — PDOC.CPN
- Subjective


Date: 11/09/19


Time: 12:50


Interval history: 





He had his AICD placed yesterday. Area little sore. No syncope No more VT, no 

therapies. He really wants to go home. Dimitris in the room states she is having 

to repeat a lot of things, and he keeps asking the same questions over and over 

again. Still needing Oxygen NC at 4L. 





- Review of Systems


General: denies: fever/chills, weight/appetite/sleep changes, night sweats, 

fatigue


Respiratory: denies: cough, congestion, shortness of breath, exercise 

intolerance


Cardiovascular: denies: chest pain, palpitation, edema, paroxysmal nocturnal 

dyspnea, orthopnea


Gastrointestinal: denies: nausea, vomiting, diarrhea, constipation, abd pain, 

GI bleeding


Musculoskeletal: denies: pain, tenderness, stiffness, swelling, arthritis/

arthralgias


Neurological: denies: numbness, syncope, seizure, weakness





- Objective


Allergies/Adverse Reactions: 


 Allergies











Allergy/AdvReac Type Severity Reaction Status Date / Time


 


No Known Drug Allergies Allergy   Verified 11/08/19 13:44











Visit Medications: 


 Current Medications





Acetaminophen (Tylenol Elixir)  650 mg PO Q4H PRN


   PRN Reason: Headache/Fever/Mild Pain (1-3)


   Last Admin: 11/07/19 07:42 Dose:  650 mg


Acetaminophen/Codeine Phosphate (Tylenol #3)  1 tab PO Q4H PRN


   PRN Reason: Mild Pain (1-3)


Acetaminophen/Codeine Phosphate (Tylenol #3)  2 tab PO Q4H PRN


   PRN Reason: Moderate Pain (4-6)


   Last Admin: 11/08/19 12:46 Dose:  2 tab


Amiodarone HCl (Cordarone)  200 mg PO TID [facility]


   Last Admin: 11/09/19 08:56 Dose:  200 mg


Carvedilol (Coreg)  3.125 mg PO BID-WM [facility]


   Last Admin: 11/09/19 08:56 Dose:  3.125 mg


Cefdinir (Omnicef)  300 mg PO BID [facility]


   Stop: 11/13/19 21:01


Enoxaparin Sodium (Lovenox)  60 mg SC DAILY [facility]


   Last Admin: 11/09/19 08:56 Dose:  60 mg


Furosemide (Lasix)  40 mg SLOW IVP NOW [facility]


   Stop: 11/09/19 13:45


Ibuprofen (Motrin)  400 mg PO Q4H PRN


   PRN Reason: Fever/Mild Pain


   Last Admin: 11/08/19 20:12 Dose:  400 mg


Ipratropium Bromide (Atrovent)  2.5 ml NEB [id number] PRN


   PRN Reason: SOB &/or Wheezing


   Last Admin: 11/08/19 13:30 Dose:  2.5 ml


Miscellaneous Information (Communication Order-Pharmacy)  1 each FS 

.COMMUNICATION [facility]


Nitroglycerin (Nitrostat)  0.4 mg SL Q5MIN PRN


   PRN Reason: Chest Pain


Potassium Chloride (K-Dur)  40 meq PO NOW [facility]


   Stop: 11/09/19 13:45


Sacubitril/Valsartan (Entresto 24 Mg-26 Mg Tablet)  1 tab PO BID [facility]


   Last Admin: 11/09/19 09:55 Dose:  1 tab


Sodium Chloride (Flush - Normal Saline)  10 ml IVF Q12HR [facility]


   Last Admin: 11/09/19 08:57 Dose:  10 ml


Sodium Chloride (Flush - Normal Saline)  10 ml IVF PRN PRN


   PRN Reason: Saline Flush


Tramadol HCl (Ultram)  50 mg PO Q6H PRN


   PRN Reason: Breakthrough Pain


   Last Admin: 11/09/19 04:42 Dose:  50 mg








Vital Signs & Weight: 


 Vital Signs











  Temp Pulse Pulse BP BP Pulse Ox Pulse Ox


 


 11/09/19 12:00  98.4 F      


 


 11/09/19 09:06   91  86  151/98 H  119/85   94 L


 


 11/09/19 07:22       96 


 


 11/09/19 07:00  98.2 F      


 


 11/09/19 04:00  98.6 F      














  Pulse Ox


 


 11/09/19 12:00 


 


 11/09/19 09:06  96


 


 11/09/19 07:22 


 


 11/09/19 07:00 


 


 11/09/19 04:00 








 











Admit Weight                   239 lb 3.225 oz


 


Weight                         246 lb 4.101 oz

















- Quality Measures


CV meds: Beta Blocker: Yes, ACE/ARB: No (will start after BP stable.), Statin: 

No, ASA: Yes





- Physical Exam


General: no apparent distress


HEENT: mucus membranes moist, normocephaly


Neck: supple neck, midline trachea


Cardiac: regular rate and rhythm, no murmur


Lungs: clear to auscultation


Neuro: grossly intact, other (Problem with short term memory.)


Abdomen: active bowel sounds, soft, non-tender


Extremities: other: (Trace edema)


Skin: clear


Musculoskeletal: no pain





- Labs


Result Diagrams: 


 11/09/19 05:10





 11/09/19 05:10


 Troponin/CKMB











CK-MB (CK-2)  21.4 ng/mL (0-6.6)  H*  11/06/19  17:00    


 


Troponin I  0.189 ng/mL (< 0.028)  H  11/06/19  17:00    














- Telemetry


Sinus rhythms and dysrhythmias: sinus rhythm





- Assessment/Plan


Assessment/Plan: 





1. S/P Cardiac arrest


2. Familial Cardiomyopathy


3. Acute on Chronic systolic HF. 


4. NS VT


5. S/P AICD placement. 


6. Mild cognitive dysfunction. 








PLAN:


- Will give one dose IV lasix today. 


- Stable for transfer to the telemetry floor. 


- Continue guideline directed therapy.


- Continue to try to wean oxygen supplementation.


- Will up titrate Coreg for better BP control. 


- Will follow. 





- Critical Care Time


Critical care time (mins): 30

## 2019-11-10 NOTE — DIS
DATE OF ADMISSION:  11/05/2019



DATE OF DISCHARGE:  11/10/2019



CONSULTATIONS:  Pulmonary Critical Care with Dr. Spencer Chisholm, Cardiology 
with Dr. Azul, and Dr. Jimbo Antonio with Electrophysiology. 



PROCEDURES:  Cardiac cath on 11/05, AICD placement on 11/08.



BRIEF HISTORY OF PRESENT ILLNESS:  This is a 39-year-old gentleman with no known

past medical history, who [place] the airport.  The

patient states that the day prior, he was feeling dizzy and lightheaded and 
states

that his face was flushed.  He had called EMS and supposedly they did an EKG 
and did

not find anything.  The next day he had gone to the airport and had cardiac 
arrest

at the airport.  The patient had ROSC after several rounds of epinephrine and 
CPR.

The patient was placed on hypothermic protocol and was admitted to the

cardiovascular ICU. 



Acute hypoxic respiratory failure, status post cardiac arrest, status post AICD

placement/ Vtach/Acute systolic heart failure:  The patient was initially 
admitted to the ICU and was intubated. He had Cardiology consultation on the 
5th.  He underwent a

cardiac cath 11/5 which was normal.  The patient then had an echocardiogram done
, which

showed an ejection fraction of 20% to 25% with global hypokinesis.  It was 
thought

that the patient may have a genetic cardiomyopathy since the patient's father 
had

similar problem. The patient had a CTA of his chest, which ruled out a

dissection and chest X ray was normal.   The patient had a CT scan of his brain
, which showed no acute

abnormality.   He was extubated on 11/8.  He  underwent AICD placement on 11/
08.  EP was consulted and the patient was 

started on amiodarone 200 mg three times a day and coreg.  Entresto, and Lasix 
20 mg

daily was added per cardiology.    The patient was treated empirically for 
possible pneumonia with

ceftriaxone.  He was transitioned to cefdinir 300 mg twice daily, which the 
patient

will take for 4 more days to complete a 7 day course of antibiotics. He was 
weaned off oxygen on 11/10 and ambulated around the hallway without 
desaturation.  The patient

will  be discharged with lidocaine patch for chest wall pain from his CPR.  He

will be given instructions on how to take care of his AICD and pacemaker on

discharge.  He should follow up with his PCP in a week and Dr. Azul in 2 to 4 
weeks.  On day of discharge the patient ambulated around the hallway 



Anemia:  The patient has a hemoglobin of 11.8.  He had a vitamin B12 level, 
which

was 684 and a folate level of 12.4, which was unremarkable.  The patient can 
follow

up with his PCP in a week and have his anemia rechecked.  He should consider an

outpatient colonoscopy. 



Short-term memory impairment:  The patient was noted to repeat the same sentence

multiple times according to the wife.  This seems to have gotten better today 
and

the patient's mental status seems normal.  This is likely secondary to delirium.

His B12 level was normal.  This can be followed up further as an outpatient. 





DISCHARGE PHYSICAL EXAMINATION:  

VITAL SIGNS:  Temperature 98.4, heart rate 82,

respiratory rate 18, O2 saturation 95% on room air, blood pressure 128/80. 

GENERAL:  The patient is alert, awake, and oriented x3. 

CVS:  Regular rate and rhythm with no murmurs, rubs, or gallops. 

LUNGS:  Clear to auscultation bilaterally. 

ABDOMEN:  Positive bowel sounds, soft, nontender, nondistended. 

EXTREMITIES:  No edema.



PERTINENT LABORATORY DATA:  The patient has a hemoglobin of 11.5 and hematocrit 
of

34.5.  BMP is unremarkable.  Vitamin B12 and folate are normal.  TSH was 
slightly

elevated at 18. 



PERTINENT IMAGING:  

Chest x-ray on 11/5:  No acute disease. 

CT dissection on 11/5:  No PE or aortic dissection. 

CT brain on 11/5:  No acute disease. 

Chest x-ray on 11/5:  No consolidation mass or pleural effusion. 

Chest x-ray on 11/6:  No acute disease. 

Abdominal x-ray on 11/6:  Left nephrolithiasis.  Nonspecific bowel gas pattern. 

Chest x-ray on 11/7:  Interval placement of NG tube, no acute disease. 

Chest x-ray on 11/8:  Interval removal of endotracheal and NG tubes. 

Chest X ray 11/9:  No acute disease of the chest x-ray. 

Echocardiogram result on 11/5:  Shows EF of 20% to 25%.  Global hypokinesis, 
mild

MR, mild TR. 



DISCHARGE CONDITION:  Stable.



DISCHARGE ACTIVITY:  As tolerated.  The patient is not to lift his left arm 
above

his shoulder and avoid strenuous exercise.  He can go for walks daily.  The 
patient

to refer to pacemaker and AICD instructions for further information. 



DIET:  Heart-healthy diet.  The patient was advised to not drink more than 2 L 
of

fluid a day. 



DISCHARGE MEDICATIONS:  

1. Amiodarone 200 mg p.o. t.i.d.

2. Coreg 3.125 mg p.o. b.i.d.

3. Cefdinir 300 mg p.o. b.i.d.

4. Entresto 24 mg-26 mg one tablet p.o. b.i.d.

5. Nitroglycerin 0.4 mg sublingual tablet q.5 minutes p.r.n.

6. Lidocaine patch one topical daily.

7. Furosemide 20 mg p.o. daily.



DISCHARGE INSTRUCTIONS:  The patient is to follow up with PCP in a week and Dr. Azul

in 2 to 4 weeks.  He should have a 2 L fluid restriction. 







Job ID:  [id number]



[facility]

## 2020-01-19 ENCOUNTER — HOSPITAL ENCOUNTER (OUTPATIENT)
Dept: HOSPITAL 92 - ERS | Age: 40
Setting detail: OBSERVATION
LOS: 1 days | Discharge: HOME | End: 2020-01-20
Attending: INTERNAL MEDICINE | Admitting: INTERNAL MEDICINE
Payer: COMMERCIAL

## 2020-01-19 VITALS — BODY MASS INDEX: 32.3 KG/M2

## 2020-01-19 DIAGNOSIS — Z95.810: ICD-10-CM

## 2020-01-19 DIAGNOSIS — E03.9: ICD-10-CM

## 2020-01-19 DIAGNOSIS — Z86.74: ICD-10-CM

## 2020-01-19 DIAGNOSIS — Z79.899: ICD-10-CM

## 2020-01-19 DIAGNOSIS — I47.2: ICD-10-CM

## 2020-01-19 DIAGNOSIS — I50.22: ICD-10-CM

## 2020-01-19 DIAGNOSIS — I42.8: ICD-10-CM

## 2020-01-19 DIAGNOSIS — R79.89: ICD-10-CM

## 2020-01-19 DIAGNOSIS — R42: Primary | ICD-10-CM

## 2020-01-19 LAB
ALBUMIN SERPL BCG-MCNC: 4.2 G/DL (ref 3.5–5)
ALP SERPL-CCNC: 89 U/L (ref 40–110)
ALT SERPL W P-5'-P-CCNC: 21 U/L (ref 8–55)
ANION GAP SERPL CALC-SCNC: 13 MMOL/L (ref 10–20)
AST SERPL-CCNC: 21 U/L (ref 5–34)
BASOPHILS # BLD AUTO: 0.1 THOU/UL (ref 0–0.2)
BASOPHILS NFR BLD AUTO: 1.1 % (ref 0–1)
BILIRUB SERPL-MCNC: 0.5 MG/DL (ref 0.2–1.2)
BUN SERPL-MCNC: 16 MG/DL (ref 8.9–20.6)
CALCIUM SERPL-MCNC: 9.3 MG/DL (ref 7.8–10.44)
CHLORIDE SERPL-SCNC: 101 MMOL/L (ref 98–107)
CK MB SERPL-MCNC: 4.4 NG/ML (ref 0–6.6)
CO2 SERPL-SCNC: 27 MMOL/L (ref 22–29)
CREAT CL PREDICTED SERPL C-G-VRATE: 0 ML/MIN (ref 70–130)
EOSINOPHIL # BLD AUTO: 0.2 THOU/UL (ref 0–0.7)
EOSINOPHIL NFR BLD AUTO: 2.7 % (ref 0–10)
GLOBULIN SER CALC-MCNC: 2.8 G/DL (ref 2.4–3.5)
GLUCOSE SERPL-MCNC: 111 MG/DL (ref 70–105)
HGB BLD-MCNC: 14.1 G/DL (ref 14–18)
LYMPHOCYTES # BLD: 1.9 THOU/UL (ref 1.2–3.4)
LYMPHOCYTES NFR BLD AUTO: 29.1 % (ref 21–51)
MCH RBC QN AUTO: 32.6 PG (ref 27–31)
MCV RBC AUTO: 94 FL (ref 78–98)
MONOCYTES # BLD AUTO: 0.7 THOU/UL (ref 0.11–0.59)
MONOCYTES NFR BLD AUTO: 10.6 % (ref 0–10)
NEUTROPHILS # BLD AUTO: 3.7 THOU/UL (ref 1.4–6.5)
NEUTROPHILS NFR BLD AUTO: 56.5 % (ref 42–75)
PLATELET # BLD AUTO: 175 THOU/UL (ref 130–400)
POTASSIUM SERPL-SCNC: 3.8 MMOL/L (ref 3.5–5.1)
RBC # BLD AUTO: 4.34 MILL/UL (ref 4.7–6.1)
SODIUM SERPL-SCNC: 137 MMOL/L (ref 136–145)
WBC # BLD AUTO: 6.6 THOU/UL (ref 4.8–10.8)

## 2020-01-19 PROCEDURE — G0378 HOSPITAL OBSERVATION PER HR: HCPCS

## 2020-01-19 PROCEDURE — 94760 N-INVAS EAR/PLS OXIMETRY 1: CPT

## 2020-01-19 PROCEDURE — 36415 COLL VENOUS BLD VENIPUNCTURE: CPT

## 2020-01-19 PROCEDURE — 85025 COMPLETE CBC W/AUTO DIFF WBC: CPT

## 2020-01-19 PROCEDURE — 70450 CT HEAD/BRAIN W/O DYE: CPT

## 2020-01-19 PROCEDURE — 83880 ASSAY OF NATRIURETIC PEPTIDE: CPT

## 2020-01-19 PROCEDURE — 93005 ELECTROCARDIOGRAM TRACING: CPT

## 2020-01-19 PROCEDURE — 82553 CREATINE MB FRACTION: CPT

## 2020-01-19 PROCEDURE — 84484 ASSAY OF TROPONIN QUANT: CPT

## 2020-01-19 PROCEDURE — 84443 ASSAY THYROID STIM HORMONE: CPT

## 2020-01-19 PROCEDURE — 84439 ASSAY OF FREE THYROXINE: CPT

## 2020-01-19 PROCEDURE — 80053 COMPREHEN METABOLIC PANEL: CPT

## 2020-01-19 PROCEDURE — 71045 X-RAY EXAM CHEST 1 VIEW: CPT

## 2020-01-19 NOTE — RAD
RADIOGRAPH CHEST 1 VIEW:



DATE:

1/19/2020



HISTORY:

39-year-old male with acute chest pain



FINDINGS:

There are no airspace densities, pulmonary edema, pneumothorax, or cardiomegaly. The lateral costophr
enic angles are sharp. Left subclavian single lead AICD.



IMPRESSION:



1. No acute cardiopulmonary findings.

2. Automatic implantable cardioverter-defibrillator.



Reported By: Winston Mccrary 

Electronically Signed:  1/19/2020 10:11 PM

## 2020-01-19 NOTE — CT
CT BRAIN NONCONTRAST:



DATE:

1/19/2020



HISTORY:

39-year-old male with vertigo, lightheadedness, and nausea



FINDINGS:

There is no evidence of acute intra-axial or extra-axial hemorrhage. There is no midline shift or any
 other mass effect. There is no extra-axial fluid collection. There is no evidence of obstructive

hydrocephalus. Calvarium is intact.



IMPRESSION:

No acute intracranial findings.



Reported By: Winston Mccrary 

Electronically Signed:  1/19/2020 11:23 PM

## 2020-01-19 NOTE — HP
PRIMARY CARE PROVIDER:  Jayden Spencer MD



CHIEF COMPLAINT:  Lightheadedness.



HISTORY OF PRESENT ILLNESS:  Mr. Abbasi is a pleasant 39-year-old gentleman, who was

seen at [facility] on January 19, 2020. 



He was hospitalized at this facility from November 5, 2019 to November 10, 2019, for

acute hypoxic respiratory failure, status post cardiac arrest.  He underwent AICD

placement for ventricular tachycardia. 



He reports that since then he has had a few episodes of lightheadedness, usually

while standing up that lasts 1 to 2 seconds.  He denies any syncopal episodes.  He

denies any falls.  He started having them again last night, and these episodes

lasted on and off throughout the day.  He presented to the emergency room because of

those episodes.  He denies any shock from the defibrillator.  He also reports

feeling nauseated. 



PAST MEDICAL HISTORY:  Nonischemic cardiomyopathy, ventricular tachycardia.



PAST SURGICAL HISTORY:  Defibrillator placement.



SOCIAL HISTORY:  Occasional alcohol use, no tobacco use or recreational drug use.



ALLERGIES:  NO KNOWN DRUG ALLERGIES.



CURRENT MEDICATIONS:  

1. Coreg 6.25 mg 2 times a day.

2. Entresto 49/51 mg daily.

3. Lasix 10 mg daily.



PHYSICAL EXAMINATION:

GENERAL:  On examination, Mr. Abbasi is awake and alert, not in acute distress. 

VITAL SIGNS:  Blood pressure is 130/90, pulse 75, respiratory rate 15, and oxygen

saturation 97% on room air.  He is afebrile. 

EYES:  No scleral icterus, no conjunctival pallor. 

ENT:  Moist mucosal membranes.  No oropharyngeal erythema or exudates. 

NECK:  Supple, nontender, trachea is midline. 

RESPIRATORY:  Accessory muscles of breathing are not active.  Chest wall movements

are symmetric bilaterally.  Lungs are clear to auscultation without wheeze, rhonchi,

or crepitations. 

CARDIOVASCULAR:  S1 and S2 are heard, regular.  Peripheral pulses palpable. 

ABDOMEN:  Soft, nontender, bowel sounds are heard. 

NEUROLOGIC:  Cranial nerves 2 through 12 are intact. 

MUSCULOSKELETAL:  Power is 5/5 in all 4 extremities. 

SKIN:  No rashes or subcutaneous nodules. 

LYMPHATIC:  No cervical lymphadenopathy. 

PSYCHIATRIC:  Normal mood, normal affect, the patient is oriented to person, place,

and time. 



LABORATORY DATA:  Mr. Abbasi's labs and investigations were reviewed.  EKG shows

normal sinus rhythm, T-wave flattening in the inferolateral leads.  He also has

occasional premature ventricular complexes.  Chest x-ray does not show any pulmonary

infiltrates.  He has an unremarkable CBC and an unremarkable comprehensive metabolic

profile.  BNP is normal.  Troponin I is indeterminate at 0.041. 



ASSESSMENT AND PLAN:  Mr. Abbasi is a pleasant 39-year-old gentleman, who was seen at

[facility] on January 19, 2020.  His problem list includes: 

1. Lightheadedness:  He is presenting with lightheadedness of unknown etiology.  His

ICD has been interrogated and he reportedly did not have any arrhythmias.  I will

obtain a noncontrast CT scan of the brain to rule out any intracranial etiology.  He

will be monitored on telemetry overnight for any potential arrhythmias. 

2. Elevated troponin:  He had cardiac catheterization in November 2019 and was found

to have normal coronaries.  Elevated troponin I, most likely secondary to

nonischemic cardiomyopathy, we will trend troponins. 

Many thanks for allowing me to participate in your patient's care.  Please feel free

to contact me with any questions or concerns. 



LEVEL OF RISK:  Moderate.



LEVEL OF COMPLEXITY:  Moderate.







Job ID:  [id number]

## 2020-01-20 VITALS — SYSTOLIC BLOOD PRESSURE: 126 MMHG | DIASTOLIC BLOOD PRESSURE: 84 MMHG

## 2020-01-20 VITALS — TEMPERATURE: 97.6 F

## 2020-01-20 LAB
T4 FREE SERPL-MCNC: 0.79 NG/DL (ref 0.7–1.48)
TROPONIN I SERPL DL<=0.01 NG/ML-MCNC: 0.03 NG/ML (ref ?–0.03)
TROPONIN I SERPL DL<=0.01 NG/ML-MCNC: 0.03 NG/ML (ref ?–0.03)

## 2020-01-20 NOTE — DIS
DATE OF ADMISSION:  01/20/2020



DATE OF DISCHARGE:  01/20/2020



CONSULTATIONS: NONE

PROCEDURES: NONE 



DISCHARGE DIAGNOSES:  

Lightheadedness/fuzziness with unclear etiology, elevated

troponin, hypothyroidism on lab test, history of ventricular tachycardia with

cardiac arrest, chronic systolic heart failure. 



BRIEF HISTORY OF PRESENT ILLNESS:  This is a 39-year-old male with past medical

history of a cardiac arrest in November 2019, ventricular tachycardia, status 
post

ICD placement, chronic systolic heart failure, who presented to the emergency 
room

with a few episodes of head fuzziness.  The patient had reported that while he 
was

ambulating, he felt a few episodes of blood rushing towards his head and his 
head

felt fuzzy.  He denied any chest pain, palpitations.  He denied any shocks.  He

presented to the emergency room for further evaluation.  EKG showed normal sinus

rhythm with T-wave flattening in the inferior leads and occasional premature 
PVCs.

CBC and CMP were unremarkable.  Troponin I in the ER was 0.041.  Chest x-ray was

unremarkable and CT head was normal.  The patient was admitted for further 
workup given his recent cardiac arrest in 11/2019. 



HOSPITAL COURSE: 

 Dizziness/lightheadedness: The patient had orthostatics checked,

which were negative.   Troponins x2 were negative.  BMP was negative.  There 
were no further events on

telemetry.  The patient ambulated around the hallway and had no further

episodes of dizziness or lightheadedness.  His ICD was interrogated and showed 
no

arrhythmias.  The patient states that he had a repeat echocardiogram done in

December, which showed EF of 35-40%.  Upon calling patient's cardiologist in

[place], he was supposed to be taking spironolactone 25 daily, amiodarone 200 mg

daily, Coreg 6.25 p.o. b.i.d., Entresto 24/26 mg and Lasix 20 mg p.o. daily.  
The

patient states that he ran out of his amiodarone on Friday.  He also states 
that he

stopped taking spironolactone 2 weeks ago because he got  and did not 
want

the side effects of gynecomastia and decreased libido.  The patient was advised 
to

continue to take these medications to avoid worsening of his heart failure.  He 
was

scheduled for an appointment with his cardiologist Dr. Joshi on February 4th at 
10 a.m.  The

patient was given new prescriptions for spironolactone and amiodarone and he 
states

that he has other medications at home.  TSH was checked, which was noted to be 
14,

however, free T4 was normal.  This should be rechecked in a few months to 
assess for

amiodarone induced hypothyroidism. 





DISCHARGE PHYSICAL EXAMINATION:

  VITAL SIGNS:  Temperature 97.6, heart rate 64,

respiratory rate 16, O2 saturation 99% on room air.  Orthostatic BP show blood

pressure 127/90 sitting, 126/86 standing, 126/84 supine. 

GENERAL:  The patient is alert, awake, oriented x3. 

CVS:  Regular rate and rhythm with no murmurs, rubs, or gallops. 

LUNGS:  Clear to auscultation bilaterally. 

ABDOMEN:  Positive bowel sounds, soft, nontender, nondistended. 

EXTREMITIES:  No edema. 

HEENT:  The patient has no thyromegaly.  There is no lymphadenopathy.



PERTINENT LABORATORY DATA:

  CBC on 01/19:  Unremarkable. 

BMP on 1/19:  Unremarkable. 

LFTs:  AST 21, ALT 21, ALP 89. 

Troponin I: 0.041, 0.025, 0.027. 

BNP: 82.8. 

Free T4:. 0.79. 

TSH: 14.0201.



PERTINENT IMAGING: 

 Chest x-ray on 01/19:  Shows no acute cardiopulmonary findings. 

CT brain 1/19:  Shows no acute disease.



DISCHARGE INSTRUCTIONS:  The patient should follow up with his PCP in a week.  
He

should follow up with Dr. Isaac, his cardiologist in [place] on February 4th at 10

a.m.  He should start taking amiodarone 200 mg daily, spironolactone 25 mg daily
,

furosemide 20 mg daily, Entresto 24-26 daily, Coreg 6.125 mg daily.  He should 
have

his thyroid function tests repeated in a few months. 



DISCHARGE MEDICATIONS:  

1. Amiodarone 200 mg p.o. daily.

2. Coreg 6.125 mg p.o. b.i.d.

3. Furosemide 20 mg p.o. daily.

4. Entresto 24 mg-26, one tablet p.o. b.i.d.

5. Spironolactone 25 mg p.o. daily.







Job ID:  [id number]



[facility]

## 2020-04-21 NOTE — CT
CT ABDOMEN AND PELVIS WITH IV CONTRAST

 4/21/2020



CLINICAL INFORMATION:

Nausea and vomiting with abdominal pain which radiates to the patient's back.



COMPARISON:

 1/29/2017



Technique:

Multiple contiguous axial CT images are obtained through the abdomen and pelvis with IV contrast. Cor
onal reformatted images are provided.



FINDINGS:



Lower Chest: Single left ventricular AICD lead is partially visualized. Minimal bibasilar atelectasis
 is present.

Vessels: Abdominal aorta is normal in caliber without evidence of an aortic dissection. 



Abdomen:

Portal vein:Patent

Gallbladder: Within normal limits for CT imaging.

Liver: within normal limits.

Spleen: within normal limits.

Pancreas: within normal limits.

Adrenals: within normal limits.

Kidneys: There is mild delayed nephrogram phase of enhancement left kidney compared to the right. Mil
d left hydronephrosis and hydroureter is present with a 4 mm calculus seen in the distal left

ureter. No right-sided hydronephrosis is present.



Bowel: Colonic diverticulosis is present. Loops of small bowel are normal in caliber.

Appendix: The appendix is visualized and normal in caliber.





Peritoneum: No ascites or free air; no fluid collection.

Mesentery and Retroperitoneum: No enlarged mesenteric or retroperitoneal lymph nodes.

Abdominal Wall: within normal limits.



Pelvis:

Reproductive Organs: No pelvic masses.

Pelvis within normal limits.

Bladder: within normal limits.



Bones: No suspicious lytic or sclerotic osseous lesions are identified. There is prominent atrophy of
 the paraspinous musculature bilaterally. This is a symmetric finding.



IMPRESSION:

Partially obstructing distal left ureteral calculus measuring 4 mm with mild left hydronephrosis and 
hydroureter



Colonic diverticulosis



Nonspecific but symmetric atrophy of the bilateral paraspinous musculature.



Reported By: Jese Howard 

Electronically Signed:  4/21/2020 1:08 PM

## 2020-04-21 NOTE — RAD
CHEST 1 VIEW:

 

Date:  04/21/2020

 

HISTORY:  

Near syncope. 

 

FINDINGS:

Left ICD. Heart size normal. Lungs clear. No confluent pneumonia, overt edema, or pleural effusion. 

 

IMPRESSION: 

No significant acute intrathoracic disease. Left ICD. 

 

 

POS: RRE

## 2020-04-24 NOTE — EKG
Test Reason : 

Blood Pressure : ***/*** mmHG

Vent. Rate : 061 BPM     Atrial Rate : 061 BPM

   P-R Int : 218 ms          QRS Dur : 106 ms

    QT Int : 442 ms       P-R-T Axes : 013 -28 -06 degrees

   QTc Int : 444 ms

 

Sinus rhythm with 1st degree A-V block

Incomplete left bundle branch block

Borderline ECG

 

Confirmed by ANTHONY MARINELLI ([id number]),  PROSPER KRISHNAMURTHY ([id number]) on 4/24/2020 3:18:51 PM

 

Referred By:             Confirmed By:ANTHONY EVANS

## 2020-05-04 ENCOUNTER — HOSPITAL ENCOUNTER (OUTPATIENT)
Dept: HOSPITAL 92 - BICULT | Age: 40
Discharge: HOME | End: 2020-05-04
Attending: UROLOGY
Payer: COMMERCIAL

## 2020-05-04 DIAGNOSIS — N20.1: Primary | ICD-10-CM

## 2020-05-04 PROCEDURE — 76770 US EXAM ABDO BACK WALL COMP: CPT

## 2020-05-04 NOTE — ULT
Renal sonogram



HISTORY: Kidney stone.



COMPARISON: CT abdomen 11/5/2019.



FINDINGS: Right kidney is 10.9 cm in length and the left is 10.7 cm. No evidence of mass or hydroneph
rosis. Calcification of the superior pole left kidney on prior CT is not visualized on today's

exam.



Urinary bladder is incompletely distended.



  



IMPRESSION :

No abnormalities are demonstrated.



Reported By: MIKY Good 

Electronically Signed:  5/4/2020 8:19 AM

## 2020-07-31 ENCOUNTER — HOSPITAL ENCOUNTER (OUTPATIENT)
Dept: HOSPITAL 92 - SCSULT | Age: 40
Discharge: HOME | End: 2020-07-31
Attending: UROLOGY
Payer: COMMERCIAL

## 2020-07-31 DIAGNOSIS — N20.1: Primary | ICD-10-CM

## 2020-07-31 PROCEDURE — 76770 US EXAM ABDO BACK WALL COMP: CPT

## 2020-07-31 PROCEDURE — 74018 RADEX ABDOMEN 1 VIEW: CPT

## 2020-07-31 NOTE — ULT
Exam: Bilateral renal ultrasound



HISTORY: Ureteral calculus



COMPARISON: 5/4/2020



FINDINGS: 

Right kidney: Normal cortical echotexture. No hydronephrosis.

Right kidney measurements: 10.1 x 6.5 x 7.5 cm. 

Left kidney: Normal cortical echotexture. No hydronephrosis.

Left kidney measurements 11.0 x 4.7 x 5.6 cm. 



Urinary bladder: Normal mucosa. Prevoid volume is 111 ml.





IMPRESSION: No hydronephrosis.



Reported By: Anisa Grubbs 

Electronically Signed:  7/31/2020 10:03 AM

## 2020-07-31 NOTE — RAD
Exam: 1 view abdomen



COMPARISON: 11/6/2019

Correlation: CT 4/21/2020

HISTORY: Evaluate for renal calculi



FINDINGS: Nonspecific bowel gas pattern. No suspicious densities in the abdomen. Density in the left 
hemipelvis is presumed to be phleboliths

No pneumoperitoneum on supine projection

No osseous abnormalities



IMPRESSION: No radiographic evidence of nephrolithiasis or ureterolithiasis. No radiographic evidence
 of a distal left ureteral calculus noted on CT for 4/21/2020.



Reported By: Anisa Grubbs 

Electronically Signed:  7/31/2020 10:17 AM

## 2022-08-16 ENCOUNTER — HOSPITAL ENCOUNTER (OUTPATIENT)
Dept: HOSPITAL 92 - SLEEPLAB | Age: 42
Discharge: HOME | End: 2022-08-16
Attending: FAMILY MEDICINE
Payer: COMMERCIAL

## 2022-08-16 DIAGNOSIS — R53.83: ICD-10-CM

## 2022-08-16 DIAGNOSIS — G47.00: ICD-10-CM

## 2022-08-16 DIAGNOSIS — E66.9: ICD-10-CM

## 2022-08-16 DIAGNOSIS — G47.33: Primary | ICD-10-CM

## 2022-08-16 PROCEDURE — 95810 POLYSOM 6/> YRS 4/> PARAM: CPT

## 2022-09-25 ENCOUNTER — HOSPITAL ENCOUNTER (OUTPATIENT)
Dept: HOSPITAL 92 - SLEEPLAB | Age: 42
Discharge: HOME | End: 2022-09-25
Attending: FAMILY MEDICINE
Payer: COMMERCIAL

## 2022-09-25 DIAGNOSIS — G47.33: Primary | ICD-10-CM

## 2022-09-25 DIAGNOSIS — R53.83: ICD-10-CM

## 2022-09-25 DIAGNOSIS — E66.9: ICD-10-CM

## 2022-09-25 DIAGNOSIS — G47.00: ICD-10-CM

## 2022-09-25 DIAGNOSIS — I51.89: ICD-10-CM

## 2022-09-25 PROCEDURE — 95811 POLYSOM 6/>YRS CPAP 4/> PARM: CPT

## 2022-12-23 ENCOUNTER — HOSPITAL ENCOUNTER (INPATIENT)
Dept: HOSPITAL 92 - ERS | Age: 42
LOS: 17 days | Discharge: NURSING, REHAB OR LONG-TERM CARE | DRG: 957 | End: 2023-01-09
Attending: SURGERY | Admitting: SURGERY
Payer: COMMERCIAL

## 2022-12-23 VITALS — BODY MASS INDEX: 33.4 KG/M2

## 2022-12-23 DIAGNOSIS — E03.9: ICD-10-CM

## 2022-12-23 DIAGNOSIS — S85.092A: ICD-10-CM

## 2022-12-23 DIAGNOSIS — H05.231: ICD-10-CM

## 2022-12-23 DIAGNOSIS — E11.65: ICD-10-CM

## 2022-12-23 DIAGNOSIS — S71.101A: ICD-10-CM

## 2022-12-23 DIAGNOSIS — E87.0: ICD-10-CM

## 2022-12-23 DIAGNOSIS — E87.20: ICD-10-CM

## 2022-12-23 DIAGNOSIS — F10.129: ICD-10-CM

## 2022-12-23 DIAGNOSIS — D62: ICD-10-CM

## 2022-12-23 DIAGNOSIS — S32.019A: ICD-10-CM

## 2022-12-23 DIAGNOSIS — Z79.82: ICD-10-CM

## 2022-12-23 DIAGNOSIS — Z82.49: ICD-10-CM

## 2022-12-23 DIAGNOSIS — S06.0XAA: ICD-10-CM

## 2022-12-23 DIAGNOSIS — I42.8: ICD-10-CM
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
## 2022-12-23 DIAGNOSIS — E66.01: ICD-10-CM

## 2022-12-23 DIAGNOSIS — E83.51: ICD-10-CM

## 2022-12-23 DIAGNOSIS — Z20.822: ICD-10-CM

## 2022-12-23 DIAGNOSIS — Z86.74: ICD-10-CM

## 2022-12-23 DIAGNOSIS — S27.321A: ICD-10-CM

## 2022-12-23 DIAGNOSIS — S82.252B: Primary | ICD-10-CM

## 2022-12-23 DIAGNOSIS — E83.39: ICD-10-CM

## 2022-12-23 DIAGNOSIS — R57.8: ICD-10-CM

## 2022-12-23 DIAGNOSIS — S82.891B: ICD-10-CM

## 2022-12-23 DIAGNOSIS — R40.2412: ICD-10-CM
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
## 2022-12-23 DIAGNOSIS — E83.52: ICD-10-CM

## 2022-12-23 DIAGNOSIS — E83.42: ICD-10-CM

## 2022-12-23 DIAGNOSIS — Z95.810: ICD-10-CM

## 2022-12-23 DIAGNOSIS — R07.89: ICD-10-CM

## 2022-12-23 DIAGNOSIS — S78.112A: ICD-10-CM

## 2022-12-23 DIAGNOSIS — S31.119A: ICD-10-CM

## 2022-12-23 DIAGNOSIS — Z79.899: ICD-10-CM

## 2022-12-23 DIAGNOSIS — J96.00: ICD-10-CM
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
## 2022-12-23 DIAGNOSIS — S82.452B: ICD-10-CM

## 2022-12-23 DIAGNOSIS — T79.6XXA: ICD-10-CM

## 2022-12-23 LAB
ALBUMIN SERPL BCG-MCNC: 3.4 G/DL (ref 3.5–5)
ALP SERPL-CCNC: 67 U/L (ref 40–110)
ALT SERPL W P-5'-P-CCNC: 76 U/L (ref 8–55)
ANALYZER IN CARDIO: (no result)
ANION GAP SERPL CALC-SCNC: 20 MMOL/L (ref 10–20)
APTT PPP: 26.7 SEC (ref 22.9–36.1)
AST SERPL-CCNC: 94 U/L (ref 5–34)
BASE EXCESS STD BLDA CALC-SCNC: -13 MEQ/L
BILIRUB SERPL-MCNC: 0.2 MG/DL (ref 0.2–1.2)
BUN SERPL-MCNC: 13 MG/DL (ref 8.9–20.6)
CA-I BLDA-SCNC: 1.14 MMOL/L (ref 1.12–1.3)
CALCIUM SERPL-MCNC: 7.9 MG/DL (ref 7.8–10.44)
CHLORIDE SERPL-SCNC: 108 MMOL/L (ref 98–107)
CO2 SERPL-SCNC: 14 MMOL/L (ref 22–29)
CREAT CL PREDICTED SERPL C-G-VRATE: 0 ML/MIN (ref 70–130)
GLOBULIN SER CALC-MCNC: 2.2 G/DL (ref 2.4–3.5)
GLUCOSE SERPL-MCNC: 242 MG/DL (ref 70–105)
HCO3 BLDA-SCNC: 15.1 MEQ/L (ref 22–28)
HCT VFR BLDA CALC: 35 % (ref 42–52)
HGB BLD-MCNC: 13.8 G/DL (ref 14–18)
HGB BLDA-MCNC: 11.9 G/DL (ref 14–18)
INR PPP: 1.3
LIPASE SERPL-CCNC: 63 U/L (ref 8–78)
MCH RBC QN AUTO: 31.5 PG (ref 27–31)
MCV RBC AUTO: 97.6 FL (ref 78–98)
MDIFF COMPLETE?: YES
O2 A-A PPRESDIFF RESPIRATORY: 317.38 MMHG (ref 0–20)
PCO2 BLDA: 42.9 MMHG (ref 35–45)
PH BLDA: 7.16 [PH] (ref 7.35–7.45)
PLATELET # BLD AUTO: 224 10X3/UL (ref 130–400)
PO2 BLDA: 342 MMHG (ref 80–100)
POTASSIUM BLD-SCNC: 3.49 MMOL/L (ref 3.7–5.3)
POTASSIUM SERPL-SCNC: 3.7 MMOL/L (ref 3.5–5.1)
PROTHROMBIN TIME: 17.1 SEC (ref 12–14.7)
RBC # BLD AUTO: 4.38 MILL/UL (ref 4.7–6.1)
SODIUM SERPL-SCNC: 138 MMOL/L (ref 136–145)
SPECIMEN DRAWN FROM PATIENT: (no result)
WBC # BLD AUTO: 27.2 10X3/UL (ref 4.8–10.8)

## 2022-12-23 PROCEDURE — 96375 TX/PRO/DX INJ NEW DRUG ADDON: CPT

## 2022-12-23 PROCEDURE — 72170 X-RAY EXAM OF PELVIS: CPT

## 2022-12-23 PROCEDURE — 84100 ASSAY OF PHOSPHORUS: CPT

## 2022-12-23 PROCEDURE — 83690 ASSAY OF LIPASE: CPT

## 2022-12-23 PROCEDURE — L0639 LSO S/C SHELL/PANEL PREFAB: HCPCS

## 2022-12-23 PROCEDURE — 85384 FIBRINOGEN ACTIVITY: CPT

## 2022-12-23 PROCEDURE — 72125 CT NECK SPINE W/O DYE: CPT

## 2022-12-23 PROCEDURE — 80162 ASSAY OF DIGOXIN TOTAL: CPT

## 2022-12-23 PROCEDURE — 36430 TRANSFUSION BLD/BLD COMPNT: CPT

## 2022-12-23 PROCEDURE — 90715 TDAP VACCINE 7 YRS/> IM: CPT

## 2022-12-23 PROCEDURE — 94760 N-INVAS EAR/PLS OXIMETRY 1: CPT

## 2022-12-23 PROCEDURE — 90471 IMMUNIZATION ADMIN: CPT

## 2022-12-23 PROCEDURE — 82550 ASSAY OF CK (CPK): CPT

## 2022-12-23 PROCEDURE — 27840 TREAT ANKLE DISLOCATION: CPT

## 2022-12-23 PROCEDURE — P9016 RBC LEUKOCYTES REDUCED: HCPCS

## 2022-12-23 PROCEDURE — 82805 BLOOD GASES W/O2 SATURATION: CPT

## 2022-12-23 PROCEDURE — 80048 BASIC METABOLIC PNL TOTAL CA: CPT

## 2022-12-23 PROCEDURE — 81015 MICROSCOPIC EXAM OF URINE: CPT

## 2022-12-23 PROCEDURE — 80306 DRUG TEST PRSMV INSTRMNT: CPT

## 2022-12-23 PROCEDURE — 94002 VENT MGMT INPAT INIT DAY: CPT

## 2022-12-23 PROCEDURE — 80076 HEPATIC FUNCTION PANEL: CPT

## 2022-12-23 PROCEDURE — 27550 TREAT KNEE DISLOCATION: CPT

## 2022-12-23 PROCEDURE — P9012 CRYOPRECIPITATE EACH UNIT: HCPCS

## 2022-12-23 PROCEDURE — 88307 TISSUE EXAM BY PATHOLOGIST: CPT

## 2022-12-23 PROCEDURE — 30233K1 TRANSFUSION OF NONAUTOLOGOUS FROZEN PLASMA INTO PERIPHERAL VEIN, PERCUTANEOUS APPROACH: ICD-10-PCS | Performed by: SURGERY

## 2022-12-23 PROCEDURE — 85025 COMPLETE CBC W/AUTO DIFF WBC: CPT

## 2022-12-23 PROCEDURE — 83605 ASSAY OF LACTIC ACID: CPT

## 2022-12-23 PROCEDURE — 93005 ELECTROCARDIOGRAM TRACING: CPT

## 2022-12-23 PROCEDURE — 36416 COLLJ CAPILLARY BLOOD SPEC: CPT

## 2022-12-23 PROCEDURE — 3E033XZ INTRODUCTION OF VASOPRESSOR INTO PERIPHERAL VEIN, PERCUTANEOUS APPROACH: ICD-10-PCS | Performed by: SURGERY

## 2022-12-23 PROCEDURE — 96374 THER/PROPH/DIAG INJ IV PUSH: CPT

## 2022-12-23 PROCEDURE — U0003 INFECTIOUS AGENT DETECTION BY NUCLEIC ACID (DNA OR RNA); SEVERE ACUTE RESPIRATORY SYNDROME CORONAVIRUS 2 (SARS-COV-2) (CORONAVIRUS DISEASE [COVID-19]), AMPLIFIED PROBE TECHNIQUE, MAKING USE OF HIGH THROUGHPUT TECHNOLOGIES AS DESCRIBED BY CMS-2020-01-R: HCPCS

## 2022-12-23 PROCEDURE — P9035 PLATELET PHERES LEUKOREDUCED: HCPCS

## 2022-12-23 PROCEDURE — 74177 CT ABD & PELVIS W/CONTRAST: CPT

## 2022-12-23 PROCEDURE — 86850 RBC ANTIBODY SCREEN: CPT

## 2022-12-23 PROCEDURE — 75635 CT ANGIO ABDOMINAL ARTERIES: CPT

## 2022-12-23 PROCEDURE — U0005 INFEC AGEN DETEC AMPLI PROBE: HCPCS

## 2022-12-23 PROCEDURE — 36415 COLL VENOUS BLD VENIPUNCTURE: CPT

## 2022-12-23 PROCEDURE — 86901 BLOOD TYPING SEROLOGIC RH(D): CPT

## 2022-12-23 PROCEDURE — 83735 ASSAY OF MAGNESIUM: CPT

## 2022-12-23 PROCEDURE — 87811 SARS-COV-2 COVID19 W/OPTIC: CPT

## 2022-12-23 PROCEDURE — 5A1935Z RESPIRATORY VENTILATION, LESS THAN 24 CONSECUTIVE HOURS: ICD-10-PCS | Performed by: SURGERY

## 2022-12-23 PROCEDURE — 70450 CT HEAD/BRAIN W/O DYE: CPT

## 2022-12-23 PROCEDURE — 05HY33Z INSERTION OF INFUSION DEVICE INTO UPPER VEIN, PERCUTANEOUS APPROACH: ICD-10-PCS | Performed by: SURGERY

## 2022-12-23 PROCEDURE — 81003 URINALYSIS AUTO W/O SCOPE: CPT

## 2022-12-23 PROCEDURE — 71260 CT THORAX DX C+: CPT

## 2022-12-23 PROCEDURE — 94640 AIRWAY INHALATION TREATMENT: CPT

## 2022-12-23 PROCEDURE — 71045 X-RAY EXAM CHEST 1 VIEW: CPT

## 2022-12-23 PROCEDURE — 80307 DRUG TEST PRSMV CHEM ANLYZR: CPT

## 2022-12-23 PROCEDURE — 0SSDXZZ REPOSITION LEFT KNEE JOINT, EXTERNAL APPROACH: ICD-10-PCS | Performed by: SURGERY

## 2022-12-23 PROCEDURE — 80053 COMPREHEN METABOLIC PANEL: CPT

## 2022-12-23 PROCEDURE — S0028 INJECTION, FAMOTIDINE, 20 MG: HCPCS

## 2022-12-23 PROCEDURE — P9045 ALBUMIN (HUMAN), 5%, 250 ML: HCPCS

## 2022-12-23 PROCEDURE — 82533 TOTAL CORTISOL: CPT

## 2022-12-23 PROCEDURE — P9048 PLASMAPROTEIN FRACT,5%,250ML: HCPCS

## 2022-12-23 PROCEDURE — 85730 THROMBOPLASTIN TIME PARTIAL: CPT

## 2022-12-23 PROCEDURE — 93010 ELECTROCARDIOGRAM REPORT: CPT

## 2022-12-23 PROCEDURE — 86900 BLOOD TYPING SEROLOGIC ABO: CPT

## 2022-12-23 PROCEDURE — 30233N1 TRANSFUSION OF NONAUTOLOGOUS RED BLOOD CELLS INTO PERIPHERAL VEIN, PERCUTANEOUS APPROACH: ICD-10-PCS | Performed by: SURGERY

## 2022-12-23 PROCEDURE — 85610 PROTHROMBIN TIME: CPT

## 2022-12-23 PROCEDURE — G0390 TRAUMA RESPONS W/HOSP CRITI: HCPCS

## 2022-12-23 PROCEDURE — 97139 UNLISTED THERAPEUTIC PX: CPT

## 2022-12-23 PROCEDURE — 70486 CT MAXILLOFACIAL W/O DYE: CPT

## 2022-12-23 PROCEDURE — 81001 URINALYSIS AUTO W/SCOPE: CPT

## 2022-12-23 PROCEDURE — P9059 PLASMA, FRZ BETWEEN 8-24HOUR: HCPCS

## 2022-12-23 PROCEDURE — 30233M1 TRANSFUSION OF NONAUTOLOGOUS PLASMA CRYOPRECIPITATE INTO PERIPHERAL VEIN, PERCUTANEOUS APPROACH: ICD-10-PCS | Performed by: SURGERY

## 2022-12-23 PROCEDURE — 84484 ASSAY OF TROPONIN QUANT: CPT

## 2022-12-23 PROCEDURE — 85027 COMPLETE CBC AUTOMATED: CPT

## 2022-12-23 PROCEDURE — 83880 ASSAY OF NATRIURETIC PEPTIDE: CPT

## 2022-12-24 LAB
ALBUMIN SERPL BCG-MCNC: 3.1 G/DL (ref 3.5–5)
ALP SERPL-CCNC: 52 U/L (ref 40–110)
ALT SERPL W P-5'-P-CCNC: 32 U/L (ref 8–55)
ANALYZER IN CARDIO: (no result)
ANALYZER IN CARDIO: (no result)
ANION GAP SERPL CALC-SCNC: 14 MMOL/L (ref 10–20)
ANION GAP SERPL CALC-SCNC: 16 MMOL/L (ref 10–20)
ANION GAP SERPL CALC-SCNC: 21 MMOL/L (ref 10–20)
APTT PPP: 31.8 SEC (ref 22.9–36.1)
AST SERPL-CCNC: 65 U/L (ref 5–34)
BACTERIA UR QL AUTO: (no result) HPF
BASE EXCESS STD BLDA CALC-SCNC: 0.9 MEQ/L
BASE EXCESS STD BLDA CALC-SCNC: 2.9 MEQ/L
BASOPHILS # BLD AUTO: 0 THOU/UL (ref 0–0.2)
BASOPHILS NFR BLD AUTO: 0 % (ref 0–1)
BASOPHILS NFR BLD AUTO: 0 % (ref 0–1)
BASOPHILS NFR BLD AUTO: 0.1 % (ref 0–1)
BILIRUB DIRECT SERPL-MCNC: 0.4 MG/DL (ref 0.1–0.3)
BILIRUB SERPL-MCNC: 1.2 MG/DL (ref 0.2–1.2)
BUN SERPL-MCNC: 14 MG/DL (ref 8.9–20.6)
BUN SERPL-MCNC: 16 MG/DL (ref 8.9–20.6)
BUN SERPL-MCNC: 17 MG/DL (ref 8.9–20.6)
CA-I BLDA-SCNC: 1.24 MMOL/L (ref 1.12–1.3)
CA-I BLDA-SCNC: 1.4 MMOL/L (ref 1.12–1.3)
CALCIUM SERPL-MCNC: 10.9 MG/DL (ref 7.8–10.44)
CALCIUM SERPL-MCNC: 8.6 MG/DL (ref 7.8–10.44)
CALCIUM SERPL-MCNC: 9.4 MG/DL (ref 7.8–10.44)
CHLORIDE SERPL-SCNC: 103 MMOL/L (ref 98–107)
CHLORIDE SERPL-SCNC: 108 MMOL/L (ref 98–107)
CHLORIDE SERPL-SCNC: 112 MMOL/L (ref 98–107)
CK SERPL-CCNC: 2138 U/L (ref 30–200)
CK SERPL-CCNC: 3401 U/L (ref 30–200)
CO2 SERPL-SCNC: 20 MMOL/L (ref 22–29)
CO2 SERPL-SCNC: 27 MMOL/L (ref 22–29)
CO2 SERPL-SCNC: 30 MMOL/L (ref 22–29)
CREAT CL PREDICTED SERPL C-G-VRATE: 160 ML/MIN (ref 70–130)
CREAT CL PREDICTED SERPL C-G-VRATE: 165 ML/MIN (ref 70–130)
CREAT CL PREDICTED SERPL C-G-VRATE: 180 ML/MIN (ref 70–130)
DRUG SCREEN CUTOFF: (no result)
EOSINOPHIL # BLD AUTO: 0 THOU/UL (ref 0–0.7)
EOSINOPHIL # BLD AUTO: 0 THOU/UL (ref 0–0.7)
EOSINOPHIL # BLD AUTO: 0.1 THOU/UL (ref 0–0.7)
EOSINOPHIL NFR BLD AUTO: 0.1 % (ref 0–10)
EOSINOPHIL NFR BLD AUTO: 0.3 % (ref 0–10)
EOSINOPHIL NFR BLD AUTO: 0.4 % (ref 0–10)
FIBRINOGEN PPP-MCNC: 349 MG/DL (ref 253–463)
GLUCOSE SERPL-MCNC: 109 MG/DL (ref 70–105)
GLUCOSE SERPL-MCNC: 111 MG/DL (ref 70–105)
GLUCOSE SERPL-MCNC: 152 MG/DL (ref 70–105)
HCO3 BLDA-SCNC: 24.8 MEQ/L (ref 22–28)
HCO3 BLDA-SCNC: 27.6 MEQ/L (ref 22–28)
HCT VFR BLDA CALC: 26 % (ref 42–52)
HCT VFR BLDA CALC: 29 % (ref 42–52)
HGB BLD-MCNC: 8.2 G/DL (ref 14–18)
HGB BLD-MCNC: 8.2 G/DL (ref 14–18)
HGB BLD-MCNC: 9.6 G/DL (ref 14–18)
HGB BLDA-MCNC: 8.8 G/DL (ref 14–18)
HGB BLDA-MCNC: 9.8 G/DL (ref 14–18)
INR PPP: 1.1
LYMPHOCYTES # BLD: 0.4 THOU/UL (ref 1.2–3.4)
LYMPHOCYTES # BLD: 0.9 THOU/UL (ref 1.2–3.4)
LYMPHOCYTES # BLD: 1 THOU/UL (ref 1.2–3.4)
LYMPHOCYTES NFR BLD AUTO: 3.8 % (ref 21–51)
LYMPHOCYTES NFR BLD AUTO: 7.5 % (ref 21–51)
LYMPHOCYTES NFR BLD AUTO: 9.6 % (ref 21–51)
MAGNESIUM SERPL-MCNC: 1.5 MG/DL (ref 1.6–2.6)
MAGNESIUM SERPL-MCNC: 1.8 MG/DL (ref 1.6–2.6)
MAGNESIUM SERPL-MCNC: 2 MG/DL (ref 1.6–2.6)
MCH RBC QN AUTO: 31.4 PG (ref 27–31)
MCH RBC QN AUTO: 31.5 PG (ref 27–31)
MCH RBC QN AUTO: 31.5 PG (ref 27–31)
MCV RBC AUTO: 87.7 FL (ref 78–98)
MCV RBC AUTO: 88.1 FL (ref 78–98)
MCV RBC AUTO: 88.9 FL (ref 78–98)
MONOCYTES # BLD AUTO: 1.1 THOU/UL (ref 0.11–0.59)
MONOCYTES # BLD AUTO: 1.2 THOU/UL (ref 0.11–0.59)
MONOCYTES # BLD AUTO: 1.7 THOU/UL (ref 0.11–0.59)
MONOCYTES NFR BLD AUTO: 10.5 % (ref 0–10)
MONOCYTES NFR BLD AUTO: 11.7 % (ref 0–10)
MONOCYTES NFR BLD AUTO: 13.8 % (ref 0–10)
NEUTROPHILS # BLD AUTO: 8 THOU/UL (ref 1.4–6.5)
NEUTROPHILS # BLD AUTO: 8.8 THOU/UL (ref 1.4–6.5)
NEUTROPHILS # BLD AUTO: 9.8 THOU/UL (ref 1.4–6.5)
NEUTROPHILS NFR BLD AUTO: 78.3 % (ref 42–75)
NEUTROPHILS NFR BLD AUTO: 78.4 % (ref 42–75)
NEUTROPHILS NFR BLD AUTO: 85.4 % (ref 42–75)
O2 A-A PPRESDIFF RESPIRATORY: 197.07 MMHG (ref 0–20)
O2 A-A PPRESDIFF RESPIRATORY: 204.12 MMHG (ref 0–20)
PCO2 BLDA: 36.7 MMHG (ref 35–45)
PCO2 BLDA: 42.7 MMHG (ref 35–45)
PH BLDA: 7.43 [PH] (ref 7.35–7.45)
PH BLDA: 7.45 [PH] (ref 7.35–7.45)
PLATELET # BLD AUTO: 106 10X3/UL (ref 130–400)
PLATELET # BLD AUTO: 121 10X3/UL (ref 130–400)
PLATELET # BLD AUTO: 88 10X3/UL (ref 130–400)
PO2 BLDA: 106.5 MMHG (ref 80–100)
PO2 BLDA: 70.4 MMHG (ref 80–100)
POTASSIUM BLD-SCNC: 3.2 MMOL/L (ref 3.7–5.3)
POTASSIUM BLD-SCNC: 3.82 MMOL/L (ref 3.7–5.3)
POTASSIUM SERPL-SCNC: 3.3 MMOL/L (ref 3.5–5.1)
POTASSIUM SERPL-SCNC: 3.9 MMOL/L (ref 3.5–5.1)
POTASSIUM SERPL-SCNC: 4.5 MMOL/L (ref 3.5–5.1)
PROTHROMBIN TIME: 14.9 SEC (ref 12–14.7)
RBC # BLD AUTO: 2.6 MILL/UL (ref 4.7–6.1)
RBC # BLD AUTO: 2.62 MILL/UL (ref 4.7–6.1)
RBC # BLD AUTO: 3.06 MILL/UL (ref 4.7–6.1)
SODIUM SERPL-SCNC: 142 MMOL/L (ref 136–145)
SODIUM SERPL-SCNC: 146 MMOL/L (ref 136–145)
SODIUM SERPL-SCNC: 151 MMOL/L (ref 136–145)
SP GR UR STRIP: 1.03 (ref 1–1.04)
TROPONIN I SERPL DL<=0.01 NG/ML-MCNC: 0.09 NG/ML (ref ?–0.03)
WBC # BLD AUTO: 10.2 10X3/UL (ref 4.8–10.8)
WBC # BLD AUTO: 10.3 10X3/UL (ref 4.8–10.8)
WBC # BLD AUTO: 12.6 10X3/UL (ref 4.8–10.8)
WBC UR QL AUTO: (no result) HPF (ref 0–3)

## 2022-12-24 PROCEDURE — 0Y6D0Z3 DETACHMENT AT LEFT UPPER LEG, LOW, OPEN APPROACH: ICD-10-PCS | Performed by: THORACIC SURGERY (CARDIOTHORACIC VASCULAR SURGERY)

## 2022-12-24 RX ADMIN — FAMOTIDINE SCH MG: 10 INJECTION, SOLUTION INTRAVENOUS at 07:53

## 2022-12-24 RX ADMIN — FAMOTIDINE SCH MG: 10 INJECTION, SOLUTION INTRAVENOUS at 20:18

## 2022-12-24 RX ADMIN — DOCUSATE SODIUM 50 MG AND SENNOSIDES 8.6 MG SCH TAB: 8.6; 5 TABLET, FILM COATED ORAL at 20:18

## 2022-12-25 LAB
ANION GAP SERPL CALC-SCNC: 11 MMOL/L (ref 10–20)
BASOPHILS # BLD AUTO: 0 THOU/UL (ref 0–0.2)
BASOPHILS # BLD AUTO: 0 THOU/UL (ref 0–0.2)
BASOPHILS NFR BLD AUTO: 0.2 % (ref 0–1)
BASOPHILS NFR BLD AUTO: 0.3 % (ref 0–1)
BUN SERPL-MCNC: 21 MG/DL (ref 8.9–20.6)
CALCIUM SERPL-MCNC: 8.9 MG/DL (ref 7.8–10.44)
CHLORIDE SERPL-SCNC: 100 MMOL/L (ref 98–107)
CK SERPL-CCNC: 5502 U/L (ref 30–200)
CO2 SERPL-SCNC: 32 MMOL/L (ref 22–29)
CREAT CL PREDICTED SERPL C-G-VRATE: 141 ML/MIN (ref 70–130)
DIGOXIN SERPL-MCNC: 2.21 NG/ML (ref 0.8–2)
EOSINOPHIL # BLD AUTO: 0 THOU/UL (ref 0–0.7)
EOSINOPHIL # BLD AUTO: 0.1 THOU/UL (ref 0–0.7)
EOSINOPHIL NFR BLD AUTO: 0.3 % (ref 0–10)
EOSINOPHIL NFR BLD AUTO: 0.7 % (ref 0–10)
GLUCOSE SERPL-MCNC: 134 MG/DL (ref 70–105)
HGB BLD-MCNC: 6.8 G/DL (ref 14–18)
HGB BLD-MCNC: 8 G/DL (ref 14–18)
LYMPHOCYTES # BLD: 1.1 THOU/UL (ref 1.2–3.4)
LYMPHOCYTES # BLD: 1.4 THOU/UL (ref 1.2–3.4)
LYMPHOCYTES NFR BLD AUTO: 13 % (ref 21–51)
LYMPHOCYTES NFR BLD AUTO: 14.2 % (ref 21–51)
MAGNESIUM SERPL-MCNC: 2.5 MG/DL (ref 1.6–2.6)
MCH RBC QN AUTO: 30.3 PG (ref 27–31)
MCH RBC QN AUTO: 33.7 PG (ref 27–31)
MCV RBC AUTO: 90.9 FL (ref 78–98)
MCV RBC AUTO: 92.1 FL (ref 78–98)
MONOCYTES # BLD AUTO: 0.9 THOU/UL (ref 0.11–0.59)
MONOCYTES # BLD AUTO: 1.1 THOU/UL (ref 0.11–0.59)
MONOCYTES NFR BLD AUTO: 10 % (ref 0–10)
MONOCYTES NFR BLD AUTO: 11.7 % (ref 0–10)
NEUTROPHILS # BLD AUTO: 6.5 THOU/UL (ref 1.4–6.5)
NEUTROPHILS # BLD AUTO: 7.1 THOU/UL (ref 1.4–6.5)
NEUTROPHILS NFR BLD AUTO: 73.6 % (ref 42–75)
NEUTROPHILS NFR BLD AUTO: 76.1 % (ref 42–75)
PLATELET # BLD AUTO: 79 10X3/UL (ref 130–400)
PLATELET # BLD AUTO: 80 10X3/UL (ref 130–400)
POTASSIUM SERPL-SCNC: 4.4 MMOL/L (ref 3.5–5.1)
RBC # BLD AUTO: 2.23 MILL/UL (ref 4.7–6.1)
RBC # BLD AUTO: 2.37 MILL/UL (ref 4.7–6.1)
SODIUM SERPL-SCNC: 139 MMOL/L (ref 136–145)
WBC # BLD AUTO: 8.6 10X3/UL (ref 4.8–10.8)
WBC # BLD AUTO: 9.7 10X3/UL (ref 4.8–10.8)

## 2022-12-25 RX ADMIN — FAMOTIDINE SCH MG: 10 INJECTION, SOLUTION INTRAVENOUS at 22:23

## 2022-12-25 RX ADMIN — DOCUSATE SODIUM 50 MG AND SENNOSIDES 8.6 MG SCH TAB: 8.6; 5 TABLET, FILM COATED ORAL at 08:14

## 2022-12-25 RX ADMIN — FAMOTIDINE SCH MG: 10 INJECTION, SOLUTION INTRAVENOUS at 08:13

## 2022-12-25 RX ADMIN — DOCUSATE SODIUM 50 MG AND SENNOSIDES 8.6 MG SCH TAB: 8.6; 5 TABLET, FILM COATED ORAL at 22:22

## 2022-12-26 LAB
ANION GAP SERPL CALC-SCNC: 11 MMOL/L (ref 10–20)
BASOPHILS # BLD AUTO: 0 THOU/UL (ref 0–0.2)
BASOPHILS NFR BLD AUTO: 0.2 % (ref 0–1)
BUN SERPL-MCNC: 13 MG/DL (ref 8.9–20.6)
CALCIUM SERPL-MCNC: 8.1 MG/DL (ref 7.8–10.44)
CHLORIDE SERPL-SCNC: 102 MMOL/L (ref 98–107)
CK SERPL-CCNC: 3645 U/L (ref 30–200)
CO2 SERPL-SCNC: 30 MMOL/L (ref 22–29)
CREAT CL PREDICTED SERPL C-G-VRATE: 220 ML/MIN (ref 70–130)
DIGOXIN SERPL-MCNC: (no result) NG/ML (ref 0.8–2)
EOSINOPHIL # BLD AUTO: 0 THOU/UL (ref 0–0.7)
EOSINOPHIL NFR BLD AUTO: 0.2 % (ref 0–10)
GLUCOSE SERPL-MCNC: 114 MG/DL (ref 70–105)
HGB BLD-MCNC: 7.4 G/DL (ref 14–18)
HGB BLD-MCNC: 8.3 G/DL (ref 14–18)
LYMPHOCYTES # BLD: 0.8 THOU/UL (ref 1.2–3.4)
LYMPHOCYTES NFR BLD AUTO: 9.5 % (ref 21–51)
MAGNESIUM SERPL-MCNC: 2.3 MG/DL (ref 1.6–2.6)
MCH RBC QN AUTO: 31.4 PG (ref 27–31)
MCV RBC AUTO: 92 FL (ref 78–98)
MONOCYTES # BLD AUTO: 0.8 THOU/UL (ref 0.11–0.59)
MONOCYTES NFR BLD AUTO: 9.7 % (ref 0–10)
NEUTROPHILS # BLD AUTO: 6.9 THOU/UL (ref 1.4–6.5)
NEUTROPHILS NFR BLD AUTO: 80.4 % (ref 42–75)
PLATELET # BLD AUTO: 77 10X3/UL (ref 130–400)
POTASSIUM SERPL-SCNC: 4.1 MMOL/L (ref 3.5–5.1)
RBC # BLD AUTO: 2.34 MILL/UL (ref 4.7–6.1)
SODIUM SERPL-SCNC: 139 MMOL/L (ref 136–145)
WBC # BLD AUTO: 8.6 10X3/UL (ref 4.8–10.8)

## 2022-12-26 PROCEDURE — 0KQR0ZZ REPAIR LEFT UPPER LEG MUSCLE, OPEN APPROACH: ICD-10-PCS | Performed by: THORACIC SURGERY (CARDIOTHORACIC VASCULAR SURGERY)

## 2022-12-26 RX ADMIN — FAMOTIDINE SCH MG: 10 INJECTION, SOLUTION INTRAVENOUS at 10:19

## 2022-12-26 RX ADMIN — DOCUSATE SODIUM 50 MG AND SENNOSIDES 8.6 MG SCH: 8.6; 5 TABLET, FILM COATED ORAL at 09:07

## 2022-12-26 RX ADMIN — HYDROCORTISONE SODIUM SUCCINATE SCH MG: 100 INJECTION, POWDER, FOR SOLUTION INTRAMUSCULAR; INTRAVENOUS at 02:59

## 2022-12-26 RX ADMIN — HYDROCORTISONE SODIUM SUCCINATE SCH MG: 100 INJECTION, POWDER, FOR SOLUTION INTRAMUSCULAR; INTRAVENOUS at 10:19

## 2022-12-26 RX ADMIN — FAMOTIDINE SCH: 10 INJECTION, SOLUTION INTRAVENOUS at 09:07

## 2022-12-26 RX ADMIN — FAMOTIDINE SCH MG: 10 INJECTION, SOLUTION INTRAVENOUS at 21:21

## 2022-12-26 RX ADMIN — HYDROCORTISONE SODIUM SUCCINATE SCH MG: 100 INJECTION, POWDER, FOR SOLUTION INTRAMUSCULAR; INTRAVENOUS at 18:46

## 2022-12-26 RX ADMIN — DOCUSATE SODIUM 50 MG AND SENNOSIDES 8.6 MG SCH TAB: 8.6; 5 TABLET, FILM COATED ORAL at 21:22

## 2022-12-27 RX ADMIN — HYDROCORTISONE SODIUM SUCCINATE SCH MG: 100 INJECTION, POWDER, FOR SOLUTION INTRAMUSCULAR; INTRAVENOUS at 09:52

## 2022-12-27 RX ADMIN — BACITRACIN SCH PK: 500 OINTMENT TOPICAL at 21:25

## 2022-12-27 RX ADMIN — DOCUSATE SODIUM 50 MG AND SENNOSIDES 8.6 MG SCH TAB: 8.6; 5 TABLET, FILM COATED ORAL at 09:51

## 2022-12-27 RX ADMIN — DOCUSATE SODIUM 50 MG AND SENNOSIDES 8.6 MG SCH TAB: 8.6; 5 TABLET, FILM COATED ORAL at 21:26

## 2022-12-27 RX ADMIN — HYDROCORTISONE SODIUM SUCCINATE SCH MG: 100 INJECTION, POWDER, FOR SOLUTION INTRAMUSCULAR; INTRAVENOUS at 02:33

## 2022-12-27 RX ADMIN — HYDROCORTISONE SODIUM SUCCINATE SCH: 100 INJECTION, POWDER, FOR SOLUTION INTRAMUSCULAR; INTRAVENOUS at 19:27

## 2022-12-27 RX ADMIN — HYDROCORTISONE SODIUM SUCCINATE SCH MG: 100 INJECTION, POWDER, FOR SOLUTION INTRAMUSCULAR; INTRAVENOUS at 18:44

## 2022-12-28 LAB
ANION GAP SERPL CALC-SCNC: 12 MMOL/L (ref 10–20)
BASOPHILS # BLD AUTO: 0 THOU/UL (ref 0–0.2)
BASOPHILS NFR BLD AUTO: 0.4 % (ref 0–1)
BUN SERPL-MCNC: 16 MG/DL (ref 8.9–20.6)
CALCIUM SERPL-MCNC: 8.6 MG/DL (ref 7.8–10.44)
CHLORIDE SERPL-SCNC: 100 MMOL/L (ref 98–107)
CK SERPL-CCNC: 1208 U/L (ref 30–200)
CO2 SERPL-SCNC: 28 MMOL/L (ref 22–29)
CREAT CL PREDICTED SERPL C-G-VRATE: 251 ML/MIN (ref 70–130)
EOSINOPHIL # BLD AUTO: 0.3 THOU/UL (ref 0–0.7)
EOSINOPHIL NFR BLD AUTO: 3.1 % (ref 0–10)
GLUCOSE SERPL-MCNC: 92 MG/DL (ref 70–105)
HGB BLD-MCNC: 8.3 G/DL (ref 14–18)
LYMPHOCYTES # BLD: 1.9 THOU/UL (ref 1.2–3.4)
LYMPHOCYTES NFR BLD AUTO: 23 % (ref 21–51)
MAGNESIUM SERPL-MCNC: 2.1 MG/DL (ref 1.6–2.6)
MCH RBC QN AUTO: 31 PG (ref 27–31)
MCV RBC AUTO: 94.5 FL (ref 78–98)
MONOCYTES # BLD AUTO: 1 THOU/UL (ref 0.11–0.59)
MONOCYTES NFR BLD AUTO: 11.3 % (ref 0–10)
NEUTROPHILS # BLD AUTO: 5.2 THOU/UL (ref 1.4–6.5)
NEUTROPHILS NFR BLD AUTO: 62.2 % (ref 42–75)
PLATELET # BLD AUTO: 168 10X3/UL (ref 130–400)
POTASSIUM SERPL-SCNC: 3.8 MMOL/L (ref 3.5–5.1)
RBC # BLD AUTO: 2.68 MILL/UL (ref 4.7–6.1)
SODIUM SERPL-SCNC: 136 MMOL/L (ref 136–145)
WBC # BLD AUTO: 8.4 10X3/UL (ref 4.8–10.8)

## 2022-12-28 RX ADMIN — BACITRACIN SCH PK: 500 OINTMENT TOPICAL at 20:25

## 2022-12-28 RX ADMIN — BACITRACIN SCH PK: 500 OINTMENT TOPICAL at 10:57

## 2022-12-28 RX ADMIN — DOCUSATE SODIUM 50 MG AND SENNOSIDES 8.6 MG SCH: 8.6; 5 TABLET, FILM COATED ORAL at 20:22

## 2022-12-28 RX ADMIN — SACUBITRIL AND VALSARTAN SCH TAB: 49; 51 TABLET, FILM COATED ORAL at 20:25

## 2022-12-29 LAB
CAUTI INDICATIONS FOR CULTURE: (no result)
GLUCOSE UR STRIP-MCNC: 30 MG/DL
RBC UR QL AUTO: (no result) HPF (ref 0–3)
SP GR UR STRIP: 1.02 (ref 1–1.04)
WBC UR QL AUTO: (no result) HPF (ref 0–3)

## 2022-12-29 RX ADMIN — SACUBITRIL AND VALSARTAN SCH TAB: 49; 51 TABLET, FILM COATED ORAL at 10:56

## 2022-12-29 RX ADMIN — BACITRACIN SCH PK: 500 OINTMENT TOPICAL at 10:53

## 2022-12-29 RX ADMIN — DOCUSATE SODIUM 50 MG AND SENNOSIDES 8.6 MG SCH: 8.6; 5 TABLET, FILM COATED ORAL at 20:08

## 2022-12-29 RX ADMIN — SACUBITRIL AND VALSARTAN SCH TAB: 49; 51 TABLET, FILM COATED ORAL at 20:07

## 2022-12-29 RX ADMIN — ASPIRIN SCH MG: 81 TABLET ORAL at 10:53

## 2022-12-29 RX ADMIN — DOCUSATE SODIUM 50 MG AND SENNOSIDES 8.6 MG SCH TAB: 8.6; 5 TABLET, FILM COATED ORAL at 10:54

## 2022-12-29 RX ADMIN — BACITRACIN SCH PK: 500 OINTMENT TOPICAL at 20:08

## 2022-12-30 RX ADMIN — ASPIRIN SCH MG: 81 TABLET ORAL at 08:54

## 2022-12-30 RX ADMIN — DOCUSATE SODIUM 50 MG AND SENNOSIDES 8.6 MG SCH: 8.6; 5 TABLET, FILM COATED ORAL at 08:55

## 2022-12-30 RX ADMIN — BACITRACIN SCH PK: 500 OINTMENT TOPICAL at 08:54

## 2022-12-30 RX ADMIN — SACUBITRIL AND VALSARTAN SCH TAB: 49; 51 TABLET, FILM COATED ORAL at 08:53

## 2022-12-30 RX ADMIN — SACUBITRIL AND VALSARTAN SCH: 49; 51 TABLET, FILM COATED ORAL at 20:13

## 2022-12-30 RX ADMIN — DOCUSATE SODIUM 50 MG AND SENNOSIDES 8.6 MG SCH: 8.6; 5 TABLET, FILM COATED ORAL at 20:10

## 2022-12-30 RX ADMIN — BACITRACIN SCH PK: 500 OINTMENT TOPICAL at 20:12

## 2022-12-31 RX ADMIN — DOCUSATE SODIUM 50 MG AND SENNOSIDES 8.6 MG SCH: 8.6; 5 TABLET, FILM COATED ORAL at 20:03

## 2022-12-31 RX ADMIN — SACUBITRIL AND VALSARTAN SCH TAB: 49; 51 TABLET, FILM COATED ORAL at 20:02

## 2022-12-31 RX ADMIN — DOCUSATE SODIUM 50 MG AND SENNOSIDES 8.6 MG SCH: 8.6; 5 TABLET, FILM COATED ORAL at 08:24

## 2022-12-31 RX ADMIN — BACITRACIN SCH PK: 500 OINTMENT TOPICAL at 08:25

## 2022-12-31 RX ADMIN — SACUBITRIL AND VALSARTAN SCH TAB: 49; 51 TABLET, FILM COATED ORAL at 08:25

## 2022-12-31 RX ADMIN — ASPIRIN SCH MG: 81 TABLET ORAL at 08:25

## 2022-12-31 RX ADMIN — BACITRACIN SCH PK: 500 OINTMENT TOPICAL at 20:03

## 2023-01-01 LAB
HGB BLD-MCNC: 9 G/DL (ref 14–18)
MCH RBC QN AUTO: 31.7 PG (ref 27–31)
MCV RBC AUTO: 94 FL (ref 78–98)
PLATELET # BLD AUTO: 388 10X3/UL (ref 130–400)
RBC # BLD AUTO: 2.85 MILL/UL (ref 4.7–6.1)
WBC # BLD AUTO: 9 10X3/UL (ref 4.8–10.8)

## 2023-01-01 RX ADMIN — BACITRACIN SCH PK: 500 OINTMENT TOPICAL at 10:15

## 2023-01-01 RX ADMIN — SACUBITRIL AND VALSARTAN SCH TAB: 49; 51 TABLET, FILM COATED ORAL at 10:15

## 2023-01-01 RX ADMIN — DOCUSATE SODIUM 50 MG AND SENNOSIDES 8.6 MG SCH: 8.6; 5 TABLET, FILM COATED ORAL at 20:18

## 2023-01-01 RX ADMIN — DOCUSATE SODIUM 50 MG AND SENNOSIDES 8.6 MG SCH: 8.6; 5 TABLET, FILM COATED ORAL at 10:13

## 2023-01-01 RX ADMIN — SACUBITRIL AND VALSARTAN SCH: 49; 51 TABLET, FILM COATED ORAL at 20:18

## 2023-01-01 RX ADMIN — ASPIRIN SCH MG: 81 TABLET ORAL at 10:12

## 2023-01-01 RX ADMIN — BACITRACIN SCH PK: 500 OINTMENT TOPICAL at 20:17

## 2023-01-02 RX ADMIN — DOCUSATE SODIUM 50 MG AND SENNOSIDES 8.6 MG SCH: 8.6; 5 TABLET, FILM COATED ORAL at 21:07

## 2023-01-02 RX ADMIN — ASPIRIN SCH MG: 81 TABLET ORAL at 08:42

## 2023-01-02 RX ADMIN — BACITRACIN SCH PK: 500 OINTMENT TOPICAL at 21:04

## 2023-01-02 RX ADMIN — SACUBITRIL AND VALSARTAN SCH TAB: 49; 51 TABLET, FILM COATED ORAL at 21:06

## 2023-01-02 RX ADMIN — BACITRACIN SCH PK: 500 OINTMENT TOPICAL at 08:42

## 2023-01-02 RX ADMIN — DOCUSATE SODIUM 50 MG AND SENNOSIDES 8.6 MG SCH: 8.6; 5 TABLET, FILM COATED ORAL at 08:47

## 2023-01-02 RX ADMIN — SACUBITRIL AND VALSARTAN SCH TAB: 49; 51 TABLET, FILM COATED ORAL at 08:42

## 2023-01-03 RX ADMIN — DOCUSATE SODIUM 50 MG AND SENNOSIDES 8.6 MG SCH: 8.6; 5 TABLET, FILM COATED ORAL at 09:38

## 2023-01-03 RX ADMIN — BACITRACIN SCH PK: 500 OINTMENT TOPICAL at 09:33

## 2023-01-03 RX ADMIN — DOCUSATE SODIUM 50 MG AND SENNOSIDES 8.6 MG SCH: 8.6; 5 TABLET, FILM COATED ORAL at 20:33

## 2023-01-03 RX ADMIN — SACUBITRIL AND VALSARTAN SCH TAB: 49; 51 TABLET, FILM COATED ORAL at 09:33

## 2023-01-03 RX ADMIN — SACUBITRIL AND VALSARTAN SCH TAB: 49; 51 TABLET, FILM COATED ORAL at 20:31

## 2023-01-03 RX ADMIN — ASPIRIN SCH MG: 81 TABLET ORAL at 09:33

## 2023-01-03 RX ADMIN — BACITRACIN SCH PK: 500 OINTMENT TOPICAL at 20:24

## 2023-01-04 RX ADMIN — DOCUSATE SODIUM 50 MG AND SENNOSIDES 8.6 MG SCH: 8.6; 5 TABLET, FILM COATED ORAL at 09:16

## 2023-01-04 RX ADMIN — BACITRACIN SCH PK: 500 OINTMENT TOPICAL at 09:09

## 2023-01-04 RX ADMIN — DOCUSATE SODIUM 50 MG AND SENNOSIDES 8.6 MG SCH: 8.6; 5 TABLET, FILM COATED ORAL at 22:49

## 2023-01-04 RX ADMIN — BACITRACIN SCH PK: 500 OINTMENT TOPICAL at 21:54

## 2023-01-04 RX ADMIN — SACUBITRIL AND VALSARTAN SCH TAB: 49; 51 TABLET, FILM COATED ORAL at 21:54

## 2023-01-04 RX ADMIN — SACUBITRIL AND VALSARTAN SCH TAB: 49; 51 TABLET, FILM COATED ORAL at 09:08

## 2023-01-05 RX ADMIN — SACUBITRIL AND VALSARTAN SCH TAB: 49; 51 TABLET, FILM COATED ORAL at 08:49

## 2023-01-05 RX ADMIN — BACITRACIN SCH PK: 500 OINTMENT TOPICAL at 08:49

## 2023-01-05 RX ADMIN — DOCUSATE SODIUM 50 MG AND SENNOSIDES 8.6 MG SCH: 8.6; 5 TABLET, FILM COATED ORAL at 21:01

## 2023-01-05 RX ADMIN — DOCUSATE SODIUM 50 MG AND SENNOSIDES 8.6 MG SCH: 8.6; 5 TABLET, FILM COATED ORAL at 08:41

## 2023-01-05 RX ADMIN — SACUBITRIL AND VALSARTAN SCH TAB: 49; 51 TABLET, FILM COATED ORAL at 20:55

## 2023-01-05 RX ADMIN — BACITRACIN SCH PK: 500 OINTMENT TOPICAL at 20:55

## 2023-01-06 RX ADMIN — DOCUSATE SODIUM 50 MG AND SENNOSIDES 8.6 MG SCH: 8.6; 5 TABLET, FILM COATED ORAL at 10:05

## 2023-01-06 RX ADMIN — SACUBITRIL AND VALSARTAN SCH: 49; 51 TABLET, FILM COATED ORAL at 10:05

## 2023-01-06 RX ADMIN — BACITRACIN SCH: 500 OINTMENT TOPICAL at 10:05

## 2023-01-06 RX ADMIN — DOCUSATE SODIUM 50 MG AND SENNOSIDES 8.6 MG SCH: 8.6; 5 TABLET, FILM COATED ORAL at 20:12

## 2023-01-06 RX ADMIN — BACITRACIN SCH PK: 500 OINTMENT TOPICAL at 20:11

## 2023-01-06 RX ADMIN — SACUBITRIL AND VALSARTAN SCH TAB: 49; 51 TABLET, FILM COATED ORAL at 20:12

## 2023-01-07 RX ADMIN — DOCUSATE SODIUM 50 MG AND SENNOSIDES 8.6 MG SCH: 8.6; 5 TABLET, FILM COATED ORAL at 20:48

## 2023-01-07 RX ADMIN — BACITRACIN SCH PK: 500 OINTMENT TOPICAL at 09:42

## 2023-01-07 RX ADMIN — SACUBITRIL AND VALSARTAN SCH TAB: 49; 51 TABLET, FILM COATED ORAL at 20:47

## 2023-01-07 RX ADMIN — BACITRACIN SCH PK: 500 OINTMENT TOPICAL at 20:48

## 2023-01-07 RX ADMIN — SACUBITRIL AND VALSARTAN SCH TAB: 49; 51 TABLET, FILM COATED ORAL at 08:46

## 2023-01-07 RX ADMIN — DOCUSATE SODIUM 50 MG AND SENNOSIDES 8.6 MG SCH: 8.6; 5 TABLET, FILM COATED ORAL at 09:42

## 2023-01-08 RX ADMIN — SACUBITRIL AND VALSARTAN SCH TAB: 49; 51 TABLET, FILM COATED ORAL at 08:04

## 2023-01-08 RX ADMIN — BACITRACIN SCH PK: 500 OINTMENT TOPICAL at 20:27

## 2023-01-08 RX ADMIN — DOCUSATE SODIUM 50 MG AND SENNOSIDES 8.6 MG SCH TAB: 8.6; 5 TABLET, FILM COATED ORAL at 20:27

## 2023-01-08 RX ADMIN — SACUBITRIL AND VALSARTAN SCH TAB: 49; 51 TABLET, FILM COATED ORAL at 20:26

## 2023-01-08 RX ADMIN — BACITRACIN SCH PK: 500 OINTMENT TOPICAL at 08:05

## 2023-01-08 RX ADMIN — DOCUSATE SODIUM 50 MG AND SENNOSIDES 8.6 MG SCH: 8.6; 5 TABLET, FILM COATED ORAL at 08:05

## 2023-01-09 VITALS — DIASTOLIC BLOOD PRESSURE: 64 MMHG | TEMPERATURE: 98.7 F | SYSTOLIC BLOOD PRESSURE: 99 MMHG

## 2023-01-09 PROCEDURE — 0JBL0ZZ EXCISION OF RIGHT UPPER LEG SUBCUTANEOUS TISSUE AND FASCIA, OPEN APPROACH: ICD-10-PCS | Performed by: ORTHOPAEDIC SURGERY

## 2023-01-09 RX ADMIN — DOCUSATE SODIUM 50 MG AND SENNOSIDES 8.6 MG SCH: 8.6; 5 TABLET, FILM COATED ORAL at 09:12

## 2023-01-09 RX ADMIN — BACITRACIN SCH: 500 OINTMENT TOPICAL at 09:11

## 2023-01-09 RX ADMIN — SACUBITRIL AND VALSARTAN SCH TAB: 49; 51 TABLET, FILM COATED ORAL at 09:00

## 2023-01-26 ENCOUNTER — HOSPITAL ENCOUNTER (OUTPATIENT)
Dept: HOSPITAL 92 - CSHWCC | Age: 43
Discharge: HOME | End: 2023-01-26
Attending: NURSE PRACTITIONER
Payer: COMMERCIAL

## 2023-01-26 ENCOUNTER — HOSPITAL ENCOUNTER (OUTPATIENT)
Dept: HOSPITAL 92 - TBSIIMAG | Age: 43
Discharge: HOME | End: 2023-01-26
Attending: PHYSICIAN ASSISTANT
Payer: COMMERCIAL

## 2023-01-26 DIAGNOSIS — S32.019A: Primary | ICD-10-CM

## 2023-01-26 DIAGNOSIS — M43.8X6: ICD-10-CM

## 2023-01-26 DIAGNOSIS — R60.0: ICD-10-CM

## 2023-01-26 DIAGNOSIS — S91.001D: ICD-10-CM

## 2023-01-26 DIAGNOSIS — S71.001D: Primary | ICD-10-CM

## 2023-01-26 PROCEDURE — 72100 X-RAY EXAM L-S SPINE 2/3 VWS: CPT

## 2023-01-30 ENCOUNTER — HOSPITAL ENCOUNTER (OUTPATIENT)
Dept: HOSPITAL 92 - CSHWCC | Age: 43
Discharge: HOME | End: 2023-01-30
Attending: NURSE PRACTITIONER
Payer: COMMERCIAL

## 2023-01-30 DIAGNOSIS — S91.001D: ICD-10-CM

## 2023-01-30 DIAGNOSIS — S71.001D: Primary | ICD-10-CM

## 2023-01-30 DIAGNOSIS — R60.0: ICD-10-CM

## 2023-01-30 PROCEDURE — 29581 APPL MULTLAYER CMPRN SYS LEG: CPT

## 2023-01-30 PROCEDURE — 97605 NEG PRS WND THER DME<=50SQCM: CPT

## 2023-02-02 ENCOUNTER — HOSPITAL ENCOUNTER (OUTPATIENT)
Dept: HOSPITAL 92 - CSHWCC | Age: 43
Discharge: HOME | End: 2023-02-02
Attending: NURSE PRACTITIONER
Payer: COMMERCIAL

## 2023-02-02 DIAGNOSIS — R60.0: ICD-10-CM

## 2023-02-02 DIAGNOSIS — S91.001D: ICD-10-CM

## 2023-02-02 DIAGNOSIS — S71.001D: Primary | ICD-10-CM

## 2023-02-06 ENCOUNTER — HOSPITAL ENCOUNTER (OUTPATIENT)
Dept: HOSPITAL 92 - CSHWCC | Age: 43
Discharge: HOME | End: 2023-02-06
Attending: NURSE PRACTITIONER
Payer: COMMERCIAL

## 2023-02-06 DIAGNOSIS — M79.605: Primary | ICD-10-CM

## 2023-02-06 DIAGNOSIS — M79.604: ICD-10-CM

## 2023-02-10 ENCOUNTER — HOSPITAL ENCOUNTER (OUTPATIENT)
Dept: HOSPITAL 92 - CSHWCC | Age: 43
Discharge: HOME | End: 2023-02-10
Attending: NURSE PRACTITIONER
Payer: COMMERCIAL

## 2023-02-10 DIAGNOSIS — R60.0: ICD-10-CM

## 2023-02-10 DIAGNOSIS — S71.001D: Primary | ICD-10-CM

## 2023-02-10 DIAGNOSIS — S91.001D: ICD-10-CM

## 2023-02-10 PROCEDURE — 29581 APPL MULTLAYER CMPRN SYS LEG: CPT

## 2023-02-13 ENCOUNTER — HOSPITAL ENCOUNTER (OUTPATIENT)
Dept: HOSPITAL 92 - CSHWCC | Age: 43
Discharge: HOME | End: 2023-02-13
Attending: NURSE PRACTITIONER
Payer: COMMERCIAL

## 2023-02-13 DIAGNOSIS — S71.001D: Primary | ICD-10-CM

## 2023-02-13 DIAGNOSIS — S91.001D: ICD-10-CM

## 2023-02-13 DIAGNOSIS — R60.0: ICD-10-CM

## 2023-02-13 PROCEDURE — 29581 APPL MULTLAYER CMPRN SYS LEG: CPT

## 2023-02-16 ENCOUNTER — HOSPITAL ENCOUNTER (OUTPATIENT)
Dept: HOSPITAL 92 - CSHWCC | Age: 43
Discharge: HOME | End: 2023-02-16
Attending: NURSE PRACTITIONER
Payer: COMMERCIAL

## 2023-02-16 DIAGNOSIS — S91.001D: ICD-10-CM

## 2023-02-16 DIAGNOSIS — R60.0: ICD-10-CM

## 2023-02-16 DIAGNOSIS — S71.001D: Primary | ICD-10-CM

## 2023-02-20 ENCOUNTER — HOSPITAL ENCOUNTER (OUTPATIENT)
Dept: HOSPITAL 92 - CSHWCC | Age: 43
Discharge: HOME | End: 2023-02-20
Attending: NURSE PRACTITIONER
Payer: COMMERCIAL

## 2023-02-20 DIAGNOSIS — S71.001D: Primary | ICD-10-CM

## 2023-02-20 PROCEDURE — 29581 APPL MULTLAYER CMPRN SYS LEG: CPT

## 2023-02-23 ENCOUNTER — HOSPITAL ENCOUNTER (OUTPATIENT)
Dept: HOSPITAL 92 - CSHWCC | Age: 43
Discharge: HOME | End: 2023-02-23
Attending: NURSE PRACTITIONER
Payer: COMMERCIAL

## 2023-02-23 DIAGNOSIS — S71.001D: Primary | ICD-10-CM

## 2023-02-23 DIAGNOSIS — S91.001D: ICD-10-CM

## 2023-02-23 PROCEDURE — 29581 APPL MULTLAYER CMPRN SYS LEG: CPT

## 2023-02-27 ENCOUNTER — HOSPITAL ENCOUNTER (OUTPATIENT)
Dept: HOSPITAL 92 - CSHWCC | Age: 43
Discharge: HOME | End: 2023-02-27
Attending: NURSE PRACTITIONER
Payer: COMMERCIAL

## 2023-02-27 DIAGNOSIS — S91.001D: ICD-10-CM

## 2023-02-27 DIAGNOSIS — S71.001D: Primary | ICD-10-CM

## 2023-02-27 PROCEDURE — 29581 APPL MULTLAYER CMPRN SYS LEG: CPT

## 2023-03-10 ENCOUNTER — HOSPITAL ENCOUNTER (OUTPATIENT)
Dept: HOSPITAL 92 - SCSRAD | Age: 43
Discharge: HOME | End: 2023-03-10
Attending: NEUROLOGICAL SURGERY
Payer: COMMERCIAL

## 2023-03-10 DIAGNOSIS — S32.019A: Primary | ICD-10-CM

## 2023-03-10 PROCEDURE — 72100 X-RAY EXAM L-S SPINE 2/3 VWS: CPT

## 2023-03-13 ENCOUNTER — HOSPITAL ENCOUNTER (OUTPATIENT)
Dept: HOSPITAL 92 - CSHWCC | Age: 43
Discharge: HOME | End: 2023-03-13
Attending: NURSE PRACTITIONER
Payer: COMMERCIAL

## 2023-03-13 DIAGNOSIS — S71.001D: Primary | ICD-10-CM

## 2023-03-13 DIAGNOSIS — R60.0: ICD-10-CM

## 2023-03-13 DIAGNOSIS — S91.001D: ICD-10-CM

## 2023-03-13 PROCEDURE — G0463 HOSPITAL OUTPT CLINIC VISIT: HCPCS

## 2023-03-13 PROCEDURE — 99213 OFFICE O/P EST LOW 20 MIN: CPT
